# Patient Record
Sex: FEMALE | Race: WHITE | HISPANIC OR LATINO | Employment: UNEMPLOYED | URBAN - METROPOLITAN AREA
[De-identification: names, ages, dates, MRNs, and addresses within clinical notes are randomized per-mention and may not be internally consistent; named-entity substitution may affect disease eponyms.]

---
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
## 2019-03-26 ENCOUNTER — HOSPITAL ENCOUNTER (EMERGENCY)
Facility: MEDICAL CENTER | Age: 19
End: 2019-03-26
Attending: EMERGENCY MEDICINE

## 2019-03-26 VITALS
RESPIRATION RATE: 16 BRPM | TEMPERATURE: 97.7 F | SYSTOLIC BLOOD PRESSURE: 122 MMHG | WEIGHT: 140 LBS | HEART RATE: 63 BPM | DIASTOLIC BLOOD PRESSURE: 45 MMHG | BODY MASS INDEX: 21.97 KG/M2 | HEIGHT: 67 IN

## 2019-03-26 DIAGNOSIS — F41.8 SITUATIONAL ANXIETY: ICD-10-CM

## 2019-03-26 DIAGNOSIS — F43.21 SITUATIONAL DEPRESSION: ICD-10-CM

## 2019-03-26 LAB — POC BREATHALIZER: 0 PERCENT (ref 0–0.01)

## 2019-03-26 PROCEDURE — 302970 POC BREATHALIZER: Performed by: EMERGENCY MEDICINE

## 2019-03-26 PROCEDURE — 99284 EMERGENCY DEPT VISIT MOD MDM: CPT

## 2019-03-26 ASSESSMENT — ENCOUNTER SYMPTOMS
VOMITING: 0
DEPRESSION: 1
BACK PAIN: 0
NERVOUS/ANXIOUS: 1
SHORTNESS OF BREATH: 0
CHILLS: 0
FEVER: 0
HEADACHES: 0

## 2019-03-26 NOTE — ED NOTES
Legal hold de-certified by ERP.  Pt is not SI, HI, has family at bedside that feels safe bringing patient home

## 2019-03-26 NOTE — ED PROVIDER NOTES
"ED Provider Note    ED Provider Note    Primary care provider: Pcp Pt States None  Means of arrival: EMS  History obtained from: Patient  History limited by: EMS    CHIEF COMPLAINT  Chief Complaint   Patient presents with   • Legal 2000     reported SI two days ago to nurse at retirement, states she is not feeling suicidal now       HPI  Jaz Pinon is a 19 y.o. female who presents to the Emergency Department after being released from retirement.  Patient's been placed on a legal hold.  She was in retirement, at 5 PM yesterday.  During her enteric interview, she told them that she had a history of depression and anxiety and had cut into her stomach.  When she was released, she was sent here on a legal hold.  She denies any suicidal she is not medicated for her depression and anxiety.  She has no other past medical history.  She denies drug or alcohol use.  States she \"talks to her daughter\" to manage her symptoms.  She is otherwise been in her normal state of health.    REVIEW OF SYSTEMS  Review of Systems   Constitutional: Negative for chills and fever.   HENT: Negative for congestion.    Respiratory: Negative for shortness of breath.    Cardiovascular: Negative for chest pain.   Gastrointestinal: Negative for vomiting.   Genitourinary: Negative for dysuria.   Musculoskeletal: Negative for back pain.   Neurological: Negative for headaches.   Psychiatric/Behavioral: Positive for depression. The patient is nervous/anxious.    All other systems reviewed and are negative.      PAST MEDICAL HISTORY   has a past medical history of Anxiety; Depression; and PIH (pregnancy induced hypertension), third trimester (2/1/2018).    SURGICAL HISTORY   has a past surgical history that includes dental surgery.    SOCIAL HISTORY  Social History   Substance Use Topics   • Smoking status: Never Smoker   • Smokeless tobacco: Never Used   • Alcohol use No      History   Drug Use No       FAMILY HISTORY  Family History   Problem Relation Age " "of Onset   • No Known Problems Mother    • No Known Problems Father    • Hyperlipidemia Maternal Grandmother    • Asthma Brother        CURRENT MEDICATIONS  Home Medications    **Home medications have not yet been reviewed for this encounter**         ALLERGIES  No Known Allergies    PHYSICAL EXAM  VITAL SIGNS: /45   Pulse 63   Temp 36.5 °C (97.7 °F) (Oral)   Resp 16   Ht 1.702 m (5' 7\")   Wt 63.5 kg (140 lb)   BMI 21.93 kg/m²   Vitals reviewed.  Constitutional: Patient is oriented to person, place, and time. Appears well-developed and well-nourished. No distress.    Head: Normocephalic and atraumatic.   Mouth/Throat: Oropharynx is clear and moist  Eyes: Conjunctivae are normal.  Neck: Normal range of motion.   Cardiovascular: Normal rate, regular rhythm and normal heart sounds.  Pulmonary/Chest: Effort normal and breath sounds normal. No respiratory distress, no wheezes, rhonchi, or rales.  Abdominal: Soft. Bowel sounds are normal. There is no tenderness. No rebound or guarding, or peritoneal signs.  There are superficial linear marks across her abdominal wall.  Musculoskeletal: No edema  Neurological: No focal deficits.   Skin: Skin is warm and dry. No erythema. No pallor.   Psychiatric: Patient has a normal mood and affect.     LABS  Results for orders placed or performed during the hospital encounter of 03/26/19   POC BREATHALIZER   Result Value Ref Range    POC Breathalizer 0.00 0.00 - 0.01 Percent       All labs reviewed by me.    COURSE & MEDICAL DECISION MAKING  Pertinent Labs & Imaging studies reviewed. (See chart for details)    Obtained and reviewed past medical records.  Patient's last encounter is a postpartum visit from March 20 of 2018, last year.  This is after a normal spontaneous vaginal delivery.  Last a prior ED note in August 2017 with  Gastritis and first trimester pregnancy with associated urinary tract infection.    4:16 AM - Patient seen and examined at bedside.  Patient presents " here on a legal hold.  This was initiated in skilled nursing.  Patient denies having any active suicidal ideations or homicidal ideations.  She does not appear intoxicated.  Awaiting breathalyzer and drug screen,.  Patient will be seen by the alert team, prior to disposition.     5:15 AM discussed with the alert team who was able to evaluate the patient.  Sounds like there was an altercation, argument home and police arrived and to diffuse the situation, she was brought to skilled nursing.  She was quickly released.  Patient has a history of depression, she wants no medications.  She does not live in this community and does not plan to stay so she declined any resources for this area.  He agrees, the patient does not meet criteria for legal hold at this time.  Mother's here.  If mother feels comfortable, taking the patient home she can stay with her.  Then patient plans to go and live with her .    5:25 AM patient reevaluated the bedside.  Again denies SI or HI.  Family is at the bedside now.  They do feel comfortable taking her home.  Again, patient is not clinically intoxicated.  She states she simply came here to  her tax check when she plans to return to [place] to be with her  and her family.  At this time, she does not meet criteria for legal hold.  It was discontinued.  She will be discharged home in stable condition.        FINAL IMPRESSION  1. Situational depression    2. Situational anxiety

## 2019-03-26 NOTE — ED TRIAGE NOTES
BIBA from halfway on legal 2000.  Pt was put on a legal hold by RN at the halfway for self harm behavior.  Pt dose have self inflicted superficial lacerations to her abdomen.  She has a history of cutting and self harm behavior.  She currently denies SI, HI. She is calm and appropriate at this time.

## 2019-03-26 NOTE — DISCHARGE PLANNING
Patient sitting on bed.  Calm and cooperative.  Denies suicidal/homicidal ideation.  Was placed on hold at long term for self mutilation.  According to ERP very superficial scratches were made to abdomen.  Patient future oriented.  Strong family support  (Parents in waiting room at time of our conversation).  Patient admits to some anxiety and depression.  Adamantly denies need/want for mental health treatment.  Declined resources.  Bedside RN confirmed parents feel safe taking patient home.  Educated patient to return to the ER if thoughts of self harm occur.  Also educated patient to return to the ER if mood deteriorates.  Patient to be discharged to family and will be returning to live with her  in [facility].

## 2019-05-11 ENCOUNTER — APPOINTMENT (OUTPATIENT)
Dept: RADIOLOGY | Facility: MEDICAL CENTER | Age: 19
End: 2019-05-11
Attending: EMERGENCY MEDICINE

## 2019-05-11 ENCOUNTER — HOSPITAL ENCOUNTER (EMERGENCY)
Facility: MEDICAL CENTER | Age: 19
End: 2019-05-11
Attending: EMERGENCY MEDICINE

## 2019-05-11 VITALS
WEIGHT: 145.28 LBS | SYSTOLIC BLOOD PRESSURE: 114 MMHG | HEART RATE: 74 BPM | TEMPERATURE: 98.6 F | RESPIRATION RATE: 18 BRPM | DIASTOLIC BLOOD PRESSURE: 71 MMHG | BODY MASS INDEX: 24.8 KG/M2 | HEIGHT: 64 IN | OXYGEN SATURATION: 96 %

## 2019-05-11 DIAGNOSIS — R10.31 RIGHT LOWER QUADRANT ABDOMINAL PAIN: ICD-10-CM

## 2019-05-11 LAB
ALBUMIN SERPL BCP-MCNC: 5 G/DL (ref 3.2–4.9)
ALBUMIN/GLOB SERPL: 1.1 G/DL
ALP SERPL-CCNC: 91 U/L (ref 30–99)
ALT SERPL-CCNC: 24 U/L (ref 2–50)
ANION GAP SERPL CALC-SCNC: 10 MMOL/L (ref 0–11.9)
APPEARANCE UR: CLEAR
AST SERPL-CCNC: 20 U/L (ref 12–45)
BACTERIA #/AREA URNS HPF: ABNORMAL /HPF
BASOPHILS # BLD AUTO: 0.4 % (ref 0–1.8)
BASOPHILS # BLD: 0.04 K/UL (ref 0–0.12)
BILIRUB SERPL-MCNC: 0.7 MG/DL (ref 0.1–1.5)
BILIRUB UR QL STRIP.AUTO: NEGATIVE
BUN SERPL-MCNC: 12 MG/DL (ref 8–22)
CALCIUM SERPL-MCNC: 10.3 MG/DL (ref 8.5–10.5)
CHLORIDE SERPL-SCNC: 102 MMOL/L (ref 96–112)
CO2 SERPL-SCNC: 24 MMOL/L (ref 20–33)
COLOR UR: YELLOW
CREAT SERPL-MCNC: 0.74 MG/DL (ref 0.5–1.4)
EOSINOPHIL # BLD AUTO: 0.09 K/UL (ref 0–0.51)
EOSINOPHIL NFR BLD: 0.9 % (ref 0–6.9)
EPI CELLS #/AREA URNS HPF: ABNORMAL /HPF
ERYTHROCYTE [DISTWIDTH] IN BLOOD BY AUTOMATED COUNT: 51.6 FL (ref 35.9–50)
GLOBULIN SER CALC-MCNC: 4.4 G/DL (ref 1.9–3.5)
GLUCOSE SERPL-MCNC: 98 MG/DL (ref 65–99)
GLUCOSE UR STRIP.AUTO-MCNC: NEGATIVE MG/DL
HCG SERPL QL: NEGATIVE
HCT VFR BLD AUTO: 47.7 % (ref 37–47)
HGB BLD-MCNC: 15.1 G/DL (ref 12–16)
HYALINE CASTS #/AREA URNS LPF: ABNORMAL /LPF
IMM GRANULOCYTES # BLD AUTO: 0.04 K/UL (ref 0–0.11)
IMM GRANULOCYTES NFR BLD AUTO: 0.4 % (ref 0–0.9)
KETONES UR STRIP.AUTO-MCNC: NEGATIVE MG/DL
LEUKOCYTE ESTERASE UR QL STRIP.AUTO: ABNORMAL
LIPASE SERPL-CCNC: 18 U/L (ref 11–82)
LYMPHOCYTES # BLD AUTO: 2.11 K/UL (ref 1–4.8)
LYMPHOCYTES NFR BLD: 21 % (ref 22–41)
MCH RBC QN AUTO: 28.2 PG (ref 27–33)
MCHC RBC AUTO-ENTMCNC: 31.7 G/DL (ref 33.6–35)
MCV RBC AUTO: 89.2 FL (ref 81.4–97.8)
MICRO URNS: ABNORMAL
MONOCYTES # BLD AUTO: 0.49 K/UL (ref 0–0.85)
MONOCYTES NFR BLD AUTO: 4.9 % (ref 0–13.4)
NEUTROPHILS # BLD AUTO: 7.3 K/UL (ref 2–7.15)
NEUTROPHILS NFR BLD: 72.4 % (ref 44–72)
NITRITE UR QL STRIP.AUTO: NEGATIVE
NRBC # BLD AUTO: 0 K/UL
NRBC BLD-RTO: 0 /100 WBC
PH UR STRIP.AUTO: 6 [PH]
PLATELET # BLD AUTO: 394 K/UL (ref 164–446)
PMV BLD AUTO: 9.3 FL (ref 9–12.9)
POTASSIUM SERPL-SCNC: 4.1 MMOL/L (ref 3.6–5.5)
PROT SERPL-MCNC: 9.4 G/DL (ref 6–8.2)
PROT UR QL STRIP: NEGATIVE MG/DL
RBC # BLD AUTO: 5.35 M/UL (ref 4.2–5.4)
RBC # URNS HPF: ABNORMAL /HPF
RBC UR QL AUTO: NEGATIVE
SODIUM SERPL-SCNC: 136 MMOL/L (ref 135–145)
SP GR UR STRIP.AUTO: 1.03
UROBILINOGEN UR STRIP.AUTO-MCNC: 1 MG/DL
WBC # BLD AUTO: 10.1 K/UL (ref 4.8–10.8)
WBC #/AREA URNS HPF: ABNORMAL /HPF

## 2019-05-11 PROCEDURE — 84703 CHORIONIC GONADOTROPIN ASSAY: CPT

## 2019-05-11 PROCEDURE — 85025 COMPLETE CBC W/AUTO DIFF WBC: CPT

## 2019-05-11 PROCEDURE — 99285 EMERGENCY DEPT VISIT HI MDM: CPT

## 2019-05-11 PROCEDURE — 700111 HCHG RX REV CODE 636 W/ 250 OVERRIDE (IP): Performed by: EMERGENCY MEDICINE

## 2019-05-11 PROCEDURE — 96374 THER/PROPH/DIAG INJ IV PUSH: CPT

## 2019-05-11 PROCEDURE — 700117 HCHG RX CONTRAST REV CODE 255: Performed by: EMERGENCY MEDICINE

## 2019-05-11 PROCEDURE — 81001 URINALYSIS AUTO W/SCOPE: CPT

## 2019-05-11 PROCEDURE — 83690 ASSAY OF LIPASE: CPT

## 2019-05-11 PROCEDURE — 80053 COMPREHEN METABOLIC PANEL: CPT

## 2019-05-11 PROCEDURE — 74177 CT ABD & PELVIS W/CONTRAST: CPT

## 2019-05-11 PROCEDURE — 96375 TX/PRO/DX INJ NEW DRUG ADDON: CPT

## 2019-05-11 RX ORDER — DIPHENHYDRAMINE HYDROCHLORIDE 50 MG/ML
25 INJECTION INTRAMUSCULAR; INTRAVENOUS ONCE
Status: COMPLETED | OUTPATIENT
Start: 2019-05-11 | End: 2019-05-11

## 2019-05-11 RX ORDER — KETOROLAC TROMETHAMINE 30 MG/ML
INJECTION, SOLUTION INTRAMUSCULAR; INTRAVENOUS
Status: DISCONTINUED
Start: 2019-05-11 | End: 2019-05-11 | Stop reason: HOSPADM

## 2019-05-11 RX ORDER — SIMETHICONE 80 MG
80 TABLET,CHEWABLE ORAL EVERY 6 HOURS PRN
Qty: 20 TAB | Refills: 0 | Status: ON HOLD | OUTPATIENT
Start: 2019-05-11 | End: 2023-11-26

## 2019-05-11 RX ORDER — KETOROLAC TROMETHAMINE 30 MG/ML
30 INJECTION, SOLUTION INTRAMUSCULAR; INTRAVENOUS ONCE
Status: COMPLETED | OUTPATIENT
Start: 2019-05-11 | End: 2019-05-11

## 2019-05-11 RX ADMIN — DIPHENHYDRAMINE HYDROCHLORIDE 25 MG: 50 INJECTION INTRAMUSCULAR; INTRAVENOUS at 18:15

## 2019-05-11 RX ADMIN — IOHEXOL 100 ML: 350 INJECTION, SOLUTION INTRAVENOUS at 17:00

## 2019-05-11 RX ADMIN — KETOROLAC TROMETHAMINE 30 MG: 30 INJECTION, SOLUTION INTRAMUSCULAR at 14:13

## 2019-05-11 NOTE — ED NOTES
"Pt ambulates to triage with c/c right pelvic pain that radiates into right hip x2 days.  \"I can't tell if the pain is in my pelvic area or my hip.\"   Unknown pregnancy status.  A&ox4.  Pt to lobby & advised to inform RN of any changes.   "

## 2019-05-11 NOTE — ED PROVIDER NOTES
ED Provider Note    CHIEF COMPLAINT  Chief Complaint   Patient presents with   • Pelvic Pain     right side, x2 days       HPI  Jaz Pinon is a 19 y.o. female who presents with right lower quadrant abdominal pain, currently worse with movement.  She states pain has been intermittent over the past 3 days.  It started his bilateral lower abdominal cramping on day 1, moved to the right lower quadrant on day 2.  She missed her last menstrual cycle.  She is concerned she might be pregnant.  No vaginal discharge or bleeding.  No dysuria.  She states normal bowel movements.  No nausea or.  Vomiting.  Patient denies similar complaint in the past.  She denies previous abdominal surgery.    REVIEW OF SYSTEMS  Constitutional: No fever  Respiratory: No shortness of breath  Cardiac: No chest pain or syncope  Gastrointestinal: Right lower quadrant abdominal pain  Musculoskeletal: No flank pain  Neurologic: No headache  Genitourinary: No dysuria, no vaginal bleeding, recent missed menstrual cycle, LMP in March       All other systems are negative.     PAST MEDICAL HISTORY  Past Medical History:   Diagnosis Date   • Anxiety    • Depression     stopped taking medication 2 years ago. she has been admitted to [facility] 2 times   • PIH (pregnancy induced hypertension), third trimester 2/1/2018       FAMILY HISTORY  Family History   Problem Relation Age of Onset   • No Known Problems Mother    • No Known Problems Father    • Hyperlipidemia Maternal Grandmother    • Asthma Brother        SOCIAL HISTORY  Social History     Social History   • Marital status: Single     Spouse name: N/A   • Number of children: N/A   • Years of education: N/A     Social History Main Topics   • Smoking status: Never Smoker   • Smokeless tobacco: Never Used   • Alcohol use No   • Drug use: No   • Sexual activity: Yes     Partners: Male     Birth control/ protection: Pill      Comment: Unplanned pregnancy     Other Topics Concern   • Not on file  "    Social History Narrative   • No narrative on file       SURGICAL HISTORY  Past Surgical History:   Procedure Laterality Date   • DENTAL SURGERY      wisdom teeth       CURRENT MEDICATIONS  Home Medications     Reviewed by Ekta Lagunas R.N. (Registered Nurse) on 05/11/19 at 1211  Med List Status: Not Addressed   Medication Last Dose Status   docusate sodium 100 MG Cap  Active   ferrous sulfate 325 (65 Fe) MG tablet  Active   ibuprofen (MOTRIN) 600 MG Tab  Active   Prenatal MV-Min-Fe Fum-FA-DHA (PRENATAL 1 PO)  Active                ALLERGIES  No Known Allergies    PHYSICAL EXAM  VITAL SIGNS: /84   Pulse (!) 119   Temp 37 °C (98.6 °F) (Temporal)   Resp 12   Ht 1.626 m (5' 4\")   Wt 65.9 kg (145 lb 4.5 oz)   SpO2 96%   BMI 24.94 kg/m²   Constitutional: Well-nourished, no distress  ENT: Nares clear, mucous membranes moist.  Eyes:  Conjunctiva normal, No discharge.    Lymphatic: No adenopathy.   Cardiovascular: Tachycardic heart rate, Normal rhythm.   Pulmonary: No wheezing, no rales  Gastrointestinal: Tenderness over McBurney's point.  Negative Rovsing sign.  No distention, no palpable mass  Skin: Warm, Dry, No jaundice.   Musculoskeletal:  No CVA tenderness.   Neurologic:  Normal motor and sensory function, No focal deficits noted.   Psychiatric:Normal affect, Normal mood.    RADIOLOGY/PROCEDURES/Labs  Results for orders placed or performed during the hospital encounter of 05/11/19   CBC WITH DIFFERENTIAL   Result Value Ref Range    WBC 10.1 4.8 - 10.8 K/uL    RBC 5.35 4.20 - 5.40 M/uL    Hemoglobin 15.1 12.0 - 16.0 g/dL    Hematocrit 47.7 (H) 37.0 - 47.0 %    MCV 89.2 81.4 - 97.8 fL    MCH 28.2 27.0 - 33.0 pg    MCHC 31.7 (L) 33.6 - 35.0 g/dL    RDW 51.6 (H) 35.9 - 50.0 fL    Platelet Count 394 164 - 446 K/uL    MPV 9.3 9.0 - 12.9 fL    Neutrophils-Polys 72.40 (H) 44.00 - 72.00 %    Lymphocytes 21.00 (L) 22.00 - 41.00 %    Monocytes 4.90 0.00 - 13.40 %    Eosinophils 0.90 0.00 - 6.90 %    " Basophils 0.40 0.00 - 1.80 %    Immature Granulocytes 0.40 0.00 - 0.90 %    Nucleated RBC 0.00 /100 WBC    Neutrophils (Absolute) 7.30 (H) 2.00 - 7.15 K/uL    Lymphs (Absolute) 2.11 1.00 - 4.80 K/uL    Monos (Absolute) 0.49 0.00 - 0.85 K/uL    Eos (Absolute) 0.09 0.00 - 0.51 K/uL    Baso (Absolute) 0.04 0.00 - 0.12 K/uL    Immature Granulocytes (abs) 0.04 0.00 - 0.11 K/uL    NRBC (Absolute) 0.00 K/uL   COMP METABOLIC PANEL   Result Value Ref Range    Sodium 136 135 - 145 mmol/L    Potassium 4.1 3.6 - 5.5 mmol/L    Chloride 102 96 - 112 mmol/L    Co2 24 20 - 33 mmol/L    Anion Gap 10.0 0.0 - 11.9    Glucose 98 65 - 99 mg/dL    Bun 12 8 - 22 mg/dL    Creatinine 0.74 0.50 - 1.40 mg/dL    Calcium 10.3 8.5 - 10.5 mg/dL    AST(SGOT) 20 12 - 45 U/L    ALT(SGPT) 24 2 - 50 U/L    Alkaline Phosphatase 91 30 - 99 U/L    Total Bilirubin 0.7 0.1 - 1.5 mg/dL    Albumin 5.0 (H) 3.2 - 4.9 g/dL    Total Protein 9.4 (H) 6.0 - 8.2 g/dL    Globulin 4.4 (H) 1.9 - 3.5 g/dL    A-G Ratio 1.1 g/dL   LIPASE   Result Value Ref Range    Lipase 18 11 - 82 U/L   HCG QUAL SERUM   Result Value Ref Range    Beta-Hcg Qualitative Serum Negative Negative   URINALYSIS CULTURE, IF INDICATED   Result Value Ref Range    Color Yellow     Character Clear     Specific Gravity 1.029 <1.035    Ph 6.0 5.0 - 8.0    Glucose Negative Negative mg/dL    Ketones Negative Negative mg/dL    Protein Negative Negative mg/dL    Bilirubin Negative Negative    Urobilinogen, Urine 1.0 Negative    Nitrite Negative Negative    Leukocyte Esterase Trace (A) Negative    Occult Blood Negative Negative    Micro Urine Req Microscopic    URINE MICROSCOPIC (W/UA)   Result Value Ref Range    WBC 5-10 (A) /hpf    RBC 0-2 /hpf    Bacteria Few (A) None /hpf    Epithelial Cells Few /hpf    Hyaline Cast 3-5 (A) /lpf   ESTIMATED GFR   Result Value Ref Range    GFR If African American >60 >60 mL/min/1.73 m 2    GFR If Non African American >60 >60 mL/min/1.73 m 2     CT-ABDOMEN-PELVIS WITH     (Results Pending)         COURSE & MEDICAL DECISION MAKING  Pertinent Labs & Imaging studies reviewed. (See chart for details)  Patient with right lower quadrant abdominal pain, unexplained on laboratory evaluation.  She has normal white blood cell count, normal appetite however does describe generalized pain which is now localized in the right lower quadrant.  Differential diagnosis including kidney stone, gastroenteritis, mesenteric adenitis, appendicitis, ovarian cyst, tubal pregnancy.    Pregnancy test is negative.  Urinalysis shows equivocal results, 5-10 white cells, trace leukocyte esterase.  She lacks dysuria, urine will be sent for pregnancy.    CT scan is pending, will be signed out to next ER physician.    FINAL IMPRESSION  1. Right lower quadrant abdominal pain            Electronically signed by: Miguel Isaac, 5/11/2019 1:39 PM

## 2019-05-12 ENCOUNTER — HOSPITAL ENCOUNTER (EMERGENCY)
Facility: MEDICAL CENTER | Age: 19
End: 2019-05-12
Attending: EMERGENCY MEDICINE

## 2019-05-12 VITALS
BODY MASS INDEX: 25.35 KG/M2 | SYSTOLIC BLOOD PRESSURE: 117 MMHG | DIASTOLIC BLOOD PRESSURE: 53 MMHG | WEIGHT: 147.71 LBS | OXYGEN SATURATION: 96 % | TEMPERATURE: 98 F | RESPIRATION RATE: 18 BRPM | HEART RATE: 94 BPM

## 2019-05-12 DIAGNOSIS — F41.9 ANXIETY: ICD-10-CM

## 2019-05-12 LAB
ALBUMIN SERPL BCP-MCNC: 4.9 G/DL (ref 3.2–4.9)
ALBUMIN/GLOB SERPL: 1.2 G/DL
ALP SERPL-CCNC: 79 U/L (ref 30–99)
ALT SERPL-CCNC: 31 U/L (ref 2–50)
ANION GAP SERPL CALC-SCNC: 9 MMOL/L (ref 0–11.9)
AST SERPL-CCNC: 26 U/L (ref 12–45)
BASOPHILS # BLD AUTO: 0.2 % (ref 0–1.8)
BASOPHILS # BLD: 0.03 K/UL (ref 0–0.12)
BILIRUB SERPL-MCNC: 0.7 MG/DL (ref 0.1–1.5)
BUN SERPL-MCNC: 13 MG/DL (ref 8–22)
CALCIUM SERPL-MCNC: 9.7 MG/DL (ref 8.5–10.5)
CHLORIDE SERPL-SCNC: 106 MMOL/L (ref 96–112)
CO2 SERPL-SCNC: 23 MMOL/L (ref 20–33)
CREAT SERPL-MCNC: 0.72 MG/DL (ref 0.5–1.4)
EOSINOPHIL # BLD AUTO: 0.07 K/UL (ref 0–0.51)
EOSINOPHIL NFR BLD: 0.6 % (ref 0–6.9)
ERYTHROCYTE [DISTWIDTH] IN BLOOD BY AUTOMATED COUNT: 50.4 FL (ref 35.9–50)
GLOBULIN SER CALC-MCNC: 4.1 G/DL (ref 1.9–3.5)
GLUCOSE SERPL-MCNC: 94 MG/DL (ref 65–99)
HCT VFR BLD AUTO: 43.5 % (ref 37–47)
HGB BLD-MCNC: 14.2 G/DL (ref 12–16)
IMM GRANULOCYTES # BLD AUTO: 0.04 K/UL (ref 0–0.11)
IMM GRANULOCYTES NFR BLD AUTO: 0.3 % (ref 0–0.9)
LYMPHOCYTES # BLD AUTO: 2.23 K/UL (ref 1–4.8)
LYMPHOCYTES NFR BLD: 18.1 % (ref 22–41)
MCH RBC QN AUTO: 28.8 PG (ref 27–33)
MCHC RBC AUTO-ENTMCNC: 32.6 G/DL (ref 33.6–35)
MCV RBC AUTO: 88.2 FL (ref 81.4–97.8)
MONOCYTES # BLD AUTO: 0.68 K/UL (ref 0–0.85)
MONOCYTES NFR BLD AUTO: 5.5 % (ref 0–13.4)
NEUTROPHILS # BLD AUTO: 9.3 K/UL (ref 2–7.15)
NEUTROPHILS NFR BLD: 75.3 % (ref 44–72)
NRBC # BLD AUTO: 0 K/UL
NRBC BLD-RTO: 0 /100 WBC
PLATELET # BLD AUTO: 381 K/UL (ref 164–446)
PMV BLD AUTO: 9.3 FL (ref 9–12.9)
POTASSIUM SERPL-SCNC: 3.4 MMOL/L (ref 3.6–5.5)
PROT SERPL-MCNC: 9 G/DL (ref 6–8.2)
RBC # BLD AUTO: 4.93 M/UL (ref 4.2–5.4)
SODIUM SERPL-SCNC: 138 MMOL/L (ref 135–145)
WBC # BLD AUTO: 12.4 K/UL (ref 4.8–10.8)

## 2019-05-12 PROCEDURE — 96372 THER/PROPH/DIAG INJ SC/IM: CPT

## 2019-05-12 PROCEDURE — 700105 HCHG RX REV CODE 258: Performed by: EMERGENCY MEDICINE

## 2019-05-12 PROCEDURE — 700111 HCHG RX REV CODE 636 W/ 250 OVERRIDE (IP): Performed by: EMERGENCY MEDICINE

## 2019-05-12 PROCEDURE — 80053 COMPREHEN METABOLIC PANEL: CPT

## 2019-05-12 PROCEDURE — 36415 COLL VENOUS BLD VENIPUNCTURE: CPT

## 2019-05-12 PROCEDURE — 99285 EMERGENCY DEPT VISIT HI MDM: CPT

## 2019-05-12 PROCEDURE — 700102 HCHG RX REV CODE 250 W/ 637 OVERRIDE(OP): Performed by: EMERGENCY MEDICINE

## 2019-05-12 PROCEDURE — 85025 COMPLETE CBC W/AUTO DIFF WBC: CPT

## 2019-05-12 PROCEDURE — A9270 NON-COVERED ITEM OR SERVICE: HCPCS | Performed by: EMERGENCY MEDICINE

## 2019-05-12 PROCEDURE — 96374 THER/PROPH/DIAG INJ IV PUSH: CPT

## 2019-05-12 RX ORDER — DIPHENHYDRAMINE HCL 25 MG
25 TABLET ORAL ONCE
Status: COMPLETED | OUTPATIENT
Start: 2019-05-12 | End: 2019-05-12

## 2019-05-12 RX ORDER — SODIUM CHLORIDE 9 MG/ML
1000 INJECTION, SOLUTION INTRAVENOUS ONCE
Status: COMPLETED | OUTPATIENT
Start: 2019-05-12 | End: 2019-05-12

## 2019-05-12 RX ORDER — LORAZEPAM 2 MG/ML
1 INJECTION INTRAMUSCULAR ONCE
Status: COMPLETED | OUTPATIENT
Start: 2019-05-12 | End: 2019-05-12

## 2019-05-12 RX ORDER — PROMETHAZINE HYDROCHLORIDE 25 MG/1
25 TABLET ORAL ONCE
Status: COMPLETED | OUTPATIENT
Start: 2019-05-12 | End: 2019-05-12

## 2019-05-12 RX ORDER — HALOPERIDOL 5 MG/ML
5 INJECTION INTRAMUSCULAR ONCE
Status: COMPLETED | OUTPATIENT
Start: 2019-05-12 | End: 2019-05-12

## 2019-05-12 RX ADMIN — DIPHENHYDRAMINE HCL 25 MG: 25 TABLET ORAL at 19:51

## 2019-05-12 RX ADMIN — SODIUM CHLORIDE 1000 ML: 9 INJECTION, SOLUTION INTRAVENOUS at 22:25

## 2019-05-12 RX ADMIN — LORAZEPAM 1 MG: 2 INJECTION INTRAMUSCULAR; INTRAVENOUS at 20:17

## 2019-05-12 RX ADMIN — HALOPERIDOL LACTATE 5 MG: 5 INJECTION, SOLUTION INTRAMUSCULAR at 22:25

## 2019-05-12 RX ADMIN — PROMETHAZINE HYDROCHLORIDE 25 MG: 25 TABLET ORAL at 19:51

## 2019-05-12 NOTE — DISCHARGE INSTRUCTIONS
You were seen in the Emergency Department for abdominal pain.    Labs, urine test, CT scan were completed without significant acute abnormalities.    Please use 1,000mg of tylenol or 600mg of ibuprofen every 6 hours as needed for pain.  Take simethicone as needed for bloating and gas.  Drink plenty of fluids    Please follow up with your primary care physician.    Return to the Emergency Department with worsening abdominal pain, persistent vomiting, fevers greater than 100.4, or other concerns.

## 2019-05-12 NOTE — ED PROVIDER NOTES
ED PROVIDER NOTE    Scribed for Frances Omer M.D. by Doreen Baum. 5/11/2019, 5:48 PM.    This is an addendum to the note on Jaz Pinon. For further details and full chart entry, see the previously signed ED Provider Note written by Dr. Isaac (ERP).      5:12 PM I discussed the patient's case with Dr. Isaac (ERP) who will transfer care of the patient to me at this time.  She is a young otherwise healthy female who presents with few day history of right lower quadrant pain.  Labs, urine test were reassuring.  Patient is not pregnant.  Disposition is pending CT scan.    5:48 PM Patient evaluated at bedside. Discussed radiology results with patient which did not show any acute abnormalities. Patient was instructed to use Tylenol or ibuprofen at home as need for pain and was given prescription for simethicone to relieve bloatedness. Patient understands and is comfortable to discharge home with instructions on supportive care. The patient will return for new or worsening symptoms and is stable at the time of discharge.    RADIOLOGY  CT-ABDOMEN-PELVIS WITH   Final Result      Unremarkable contrast-enhanced CT of the abdomen and pelvis.      Reviewed by me.     DISPOSITION:  Patient will be discharged home in stable condition.    FOLLOW UP:  [address] [address] [phone]  Call   to establish PCP    [facility], Emergency Dept  [address] [facility] [address] [phone]    If symptoms worsen    OUTPATIENT MEDICATIONS:  New Prescriptions    SIMETHICONE (MYLICON) 80 MG CHEW TAB    Take 1 Tab by mouth every 6 hours as needed (gas/bloating).     FINAL IMPRESSION   1. Right lower quadrant abdominal pain          Doreen MALDONADO ([place]), am scribing for, and in the presence of, Frances Omer M.D..  Electronically signed by: Doreen Christina), 5/11/2019  Frances MALDONADO M.D. personally performed the services described in  this documentation, as scribed by Doreen Baum in my presence, and it is both accurate and complete.     The note accurately reflects work and decisions made by me.  Frances Omer  5/12/2019  1:40 AM

## 2019-05-13 ENCOUNTER — APPOINTMENT (OUTPATIENT)
Dept: RADIOLOGY | Facility: MEDICAL CENTER | Age: 19
End: 2019-05-13
Attending: EMERGENCY MEDICINE

## 2019-05-13 ENCOUNTER — HOSPITAL ENCOUNTER (EMERGENCY)
Facility: MEDICAL CENTER | Age: 19
End: 2019-05-13
Attending: EMERGENCY MEDICINE

## 2019-05-13 VITALS
RESPIRATION RATE: 18 BRPM | HEIGHT: 64 IN | BODY MASS INDEX: 23.9 KG/M2 | OXYGEN SATURATION: 96 % | DIASTOLIC BLOOD PRESSURE: 81 MMHG | TEMPERATURE: 98 F | SYSTOLIC BLOOD PRESSURE: 143 MMHG | WEIGHT: 140 LBS | HEART RATE: 93 BPM

## 2019-05-13 DIAGNOSIS — R10.84 GENERALIZED ABDOMINAL PAIN: ICD-10-CM

## 2019-05-13 LAB
ALBUMIN SERPL BCP-MCNC: 4.7 G/DL (ref 3.2–4.9)
ALBUMIN/GLOB SERPL: 1.2 G/DL
ALP SERPL-CCNC: 74 U/L (ref 30–99)
ALT SERPL-CCNC: 42 U/L (ref 2–50)
ANION GAP SERPL CALC-SCNC: 12 MMOL/L (ref 0–11.9)
APPEARANCE UR: CLEAR
AST SERPL-CCNC: 31 U/L (ref 12–45)
BACTERIA #/AREA URNS HPF: ABNORMAL /HPF
BASOPHILS # BLD AUTO: 0.3 % (ref 0–1.8)
BASOPHILS # BLD: 0.04 K/UL (ref 0–0.12)
BILIRUB SERPL-MCNC: 0.7 MG/DL (ref 0.1–1.5)
BILIRUB UR QL STRIP.AUTO: NEGATIVE
BUN SERPL-MCNC: 13 MG/DL (ref 8–22)
CALCIUM SERPL-MCNC: 9.3 MG/DL (ref 8.5–10.5)
CHLORIDE SERPL-SCNC: 105 MMOL/L (ref 96–112)
CO2 SERPL-SCNC: 21 MMOL/L (ref 20–33)
COLOR UR: YELLOW
CREAT SERPL-MCNC: 0.71 MG/DL (ref 0.5–1.4)
EOSINOPHIL # BLD AUTO: 0.04 K/UL (ref 0–0.51)
EOSINOPHIL NFR BLD: 0.3 % (ref 0–6.9)
EPI CELLS #/AREA URNS HPF: ABNORMAL /HPF
ERYTHROCYTE [DISTWIDTH] IN BLOOD BY AUTOMATED COUNT: 51.7 FL (ref 35.9–50)
GLOBULIN SER CALC-MCNC: 3.8 G/DL (ref 1.9–3.5)
GLUCOSE SERPL-MCNC: 92 MG/DL (ref 65–99)
GLUCOSE UR STRIP.AUTO-MCNC: NEGATIVE MG/DL
HCG UR QL: NEGATIVE
HCT VFR BLD AUTO: 41.6 % (ref 37–47)
HGB BLD-MCNC: 13.7 G/DL (ref 12–16)
HYALINE CASTS #/AREA URNS LPF: ABNORMAL /LPF
IMM GRANULOCYTES # BLD AUTO: 0.04 K/UL (ref 0–0.11)
IMM GRANULOCYTES NFR BLD AUTO: 0.3 % (ref 0–0.9)
KETONES UR STRIP.AUTO-MCNC: 80 MG/DL
LEUKOCYTE ESTERASE UR QL STRIP.AUTO: ABNORMAL
LIPASE SERPL-CCNC: 23 U/L (ref 11–82)
LYMPHOCYTES # BLD AUTO: 1.74 K/UL (ref 1–4.8)
LYMPHOCYTES NFR BLD: 14.5 % (ref 22–41)
MCH RBC QN AUTO: 29.7 PG (ref 27–33)
MCHC RBC AUTO-ENTMCNC: 32.9 G/DL (ref 33.6–35)
MCV RBC AUTO: 90 FL (ref 81.4–97.8)
MICRO URNS: ABNORMAL
MONOCYTES # BLD AUTO: 0.52 K/UL (ref 0–0.85)
MONOCYTES NFR BLD AUTO: 4.3 % (ref 0–13.4)
NEUTROPHILS # BLD AUTO: 9.61 K/UL (ref 2–7.15)
NEUTROPHILS NFR BLD: 80.3 % (ref 44–72)
NITRITE UR QL STRIP.AUTO: NEGATIVE
NRBC # BLD AUTO: 0 K/UL
NRBC BLD-RTO: 0 /100 WBC
PH UR STRIP.AUTO: 6.5 [PH]
PLATELET # BLD AUTO: 349 K/UL (ref 164–446)
PMV BLD AUTO: 9.3 FL (ref 9–12.9)
POTASSIUM SERPL-SCNC: 3.5 MMOL/L (ref 3.6–5.5)
PROT SERPL-MCNC: 8.5 G/DL (ref 6–8.2)
PROT UR QL STRIP: NEGATIVE MG/DL
RBC # BLD AUTO: 4.62 M/UL (ref 4.2–5.4)
RBC # URNS HPF: ABNORMAL /HPF
RBC UR QL AUTO: NEGATIVE
SODIUM SERPL-SCNC: 138 MMOL/L (ref 135–145)
SP GR UR STRIP.AUTO: 1.02
UROBILINOGEN UR STRIP.AUTO-MCNC: 1 MG/DL
WBC # BLD AUTO: 12 K/UL (ref 4.8–10.8)
WBC #/AREA URNS HPF: ABNORMAL /HPF

## 2019-05-13 PROCEDURE — 700105 HCHG RX REV CODE 258: Performed by: EMERGENCY MEDICINE

## 2019-05-13 PROCEDURE — 85025 COMPLETE CBC W/AUTO DIFF WBC: CPT

## 2019-05-13 PROCEDURE — 81025 URINE PREGNANCY TEST: CPT | Performed by: EMERGENCY MEDICINE

## 2019-05-13 PROCEDURE — 700111 HCHG RX REV CODE 636 W/ 250 OVERRIDE (IP): Performed by: EMERGENCY MEDICINE

## 2019-05-13 PROCEDURE — 96375 TX/PRO/DX INJ NEW DRUG ADDON: CPT

## 2019-05-13 PROCEDURE — 83690 ASSAY OF LIPASE: CPT

## 2019-05-13 PROCEDURE — 81002 URINALYSIS NONAUTO W/O SCOPE: CPT | Performed by: EMERGENCY MEDICINE

## 2019-05-13 PROCEDURE — 81002 URINALYSIS NONAUTO W/O SCOPE: CPT

## 2019-05-13 PROCEDURE — 74176 CT ABD & PELVIS W/O CONTRAST: CPT

## 2019-05-13 PROCEDURE — 96374 THER/PROPH/DIAG INJ IV PUSH: CPT

## 2019-05-13 PROCEDURE — 81001 URINALYSIS AUTO W/SCOPE: CPT

## 2019-05-13 PROCEDURE — 80053 COMPREHEN METABOLIC PANEL: CPT

## 2019-05-13 PROCEDURE — 81025 URINE PREGNANCY TEST: CPT

## 2019-05-13 PROCEDURE — 99285 EMERGENCY DEPT VISIT HI MDM: CPT

## 2019-05-13 RX ORDER — SODIUM CHLORIDE 9 MG/ML
1000 INJECTION, SOLUTION INTRAVENOUS ONCE
Status: COMPLETED | OUTPATIENT
Start: 2019-05-13 | End: 2019-05-13

## 2019-05-13 RX ORDER — DIPHENHYDRAMINE HYDROCHLORIDE 50 MG/ML
50 INJECTION INTRAMUSCULAR; INTRAVENOUS ONCE
Status: COMPLETED | OUTPATIENT
Start: 2019-05-13 | End: 2019-05-13

## 2019-05-13 RX ORDER — HALOPERIDOL 5 MG/ML
5 INJECTION INTRAMUSCULAR ONCE
Status: COMPLETED | OUTPATIENT
Start: 2019-05-13 | End: 2019-05-13

## 2019-05-13 RX ADMIN — HALOPERIDOL LACTATE 5 MG: 5 INJECTION, SOLUTION INTRAMUSCULAR at 21:02

## 2019-05-13 RX ADMIN — SODIUM CHLORIDE 1000 ML: 9 INJECTION, SOLUTION INTRAVENOUS at 21:02

## 2019-05-13 RX ADMIN — DIPHENHYDRAMINE HYDROCHLORIDE 50 MG: 50 INJECTION INTRAMUSCULAR; INTRAVENOUS at 21:02

## 2019-05-13 NOTE — ED NOTES
Hourly rounding performed. Assessed patient complaints and Bathroom/comfort needs.  Patient reports that the shaking and vision has improved after the ativan, but is still having some of those S/sx.

## 2019-05-13 NOTE — ED NOTES
Pt reports that she was seen in the ED yesterday, had a ABD/Pelvic CT w/ contrast, Since then has been having off/on body shakes. She states that she sometimes gets this with her panic attacks, but typically doesn't last this long.

## 2019-05-13 NOTE — ED NOTES
Hourly rounding performed. Assessed patient complaints and Bathroom/comfort needs.  Pt states feeling improved

## 2019-05-13 NOTE — ED PROVIDER NOTES
ED Provider Note    CHIEF COMPLAINT  Chief Complaint   Patient presents with   • Anxiety   • Medication Reaction       HPI  Jaz Pinon is a 19 y.o. female who presents with tremors and nausea.  The patient states she was here last night for pelvic pain.  She states she had CT imaging and after the CT scan she developed tremors.  She states she also has associated nausea.  She states her pelvic pain has improved.  She has not any vomiting but she does have nausea.  She states she does have a history of anxiety but this seems to be different.  She does not have any difficulty with breathing.  She is concerned this is a reaction to the contrast she received for the CT scan.  She does not have any difficulty with swallowing.  She does not have any rashes.    REVIEW OF SYSTEMS  See HPI for further details. All other systems are negative.     PAST MEDICAL HISTORY  Past Medical History:   Diagnosis Date   • PIH (pregnancy induced hypertension), third trimester 2/1/2018   • Anxiety    • Depression     stopped taking medication 2 years ago. she has been admitted to [facility] 2 times       FAMILY HISTORY  [unfilled]    SOCIAL HISTORY  Social History     Social History   • Marital status: Single     Spouse name: N/A   • Number of children: N/A   • Years of education: N/A     Social History Main Topics   • Smoking status: Never Smoker   • Smokeless tobacco: Never Used   • Alcohol use No   • Drug use: No   • Sexual activity: Yes     Partners: Male     Birth control/ protection: Pill      Comment: Unplanned pregnancy     Other Topics Concern   • Not on file     Social History Narrative   • No narrative on file       SURGICAL HISTORY  Past Surgical History:   Procedure Laterality Date   • DENTAL SURGERY      wisdom teeth       CURRENT MEDICATIONS  Home Medications     Reviewed by Fifi Liu R.N. (Registered Nurse) on 05/12/19 at 1905  Med List Status: Complete   Medication Last Dose Status   docusate sodium 100  MG Cap not taking Active   ferrous sulfate 325 (65 Fe) MG tablet not taking Active   ibuprofen (MOTRIN) 600 MG Tab not taking Active   Prenatal MV-Min-Fe Fum-FA-DHA (PRENATAL 1 PO) not taking Active   simethicone (MYLICON) 80 MG Chew Tab not taking Active                ALLERGIES  No Known Allergies    PHYSICAL EXAM  VITAL SIGNS: /72   Pulse (!) 120   Temp 36.7 °C (98 °F) (Temporal)   Resp (!) 22   Wt 67 kg (147 lb 11.3 oz)   SpO2 98%   BMI 25.35 kg/m²  Room air O2: 97    Constitutional: Anxious but nontoxic  HENT: Normocephalic, Atraumatic, Bilateral external ears normal, Oropharynx moist, No oral exudates, Nose normal.   Eyes: PERRLA, EOMI, Conjunctiva normal, No discharge.   Neck: Normal range of motion, No tenderness, Supple, No stridor.   Lymphatic: No lymphadenopathy noted.   Cardiovascular: Tachycardic heart rate, Normal rhythm, No murmurs, No rubs, No gallops.   Thorax & Lungs: Normal breath sounds, No respiratory distress, No wheezing, No chest tenderness.   Abdomen: Bowel sounds normal, Soft, No tenderness, No masses, No pulsatile masses.   Skin: Warm, Dry, No erythema, No rash.   Back: No tenderness, No CVA tenderness.   Extremities: Intact distal pulses, No edema, No tenderness, No cyanosis, No clubbing.   Musculoskeletal: Good range of motion in all major joints. No tenderness to palpation or major deformities noted.   Neurologic: Alert & oriented x 3, Normal motor function, Normal sensory function, No focal deficits noted.   Psychiatric: Anxious    COURSE & MEDICAL DECISION MAKING  Pertinent Labs & Imaging studies reviewed. (See chart for details)  This is a 19-year-old female who presents with tremors from suspected anxiety.  Initially gave the patient Phenergan and Benadryl orally.  On repeat exam she is still extremely anxious and tremulous.  Therefore the patient received intramuscular Ativan.  The patient was reexamined she continued to be tachycardic and tremulous therefore an IV was  established and the patient received a liter of fluid as well as 5 mg of Haldol.  This was finally effective and the patient has been resting comfortably.  Laboratory analysis was obtained as she did not respond to the initial treatment.  The patient does have a slight elevation in her white blood cell count however her abdomen is benign and I do not see any evidence of an infectious process.  I suspect this is all secondary to anxiety.  I will have the patient follow-up with the [facility] in 24 to 48 hours for continued outpatient management.  She will return to emerge department if she is acutely worse.    FINAL IMPRESSION  1.  Tremors  2.  Suspect secondary to anxiety    Disposition  The patient will be discharged in stable condition         Electronically signed by: Bethel Dowling, 5/12/2019 7:47 PM

## 2019-05-13 NOTE — ED TRIAGE NOTES
Chief Complaint   Patient presents with   • Anxiety   • Medication Reaction       BIB by ems for above complaint. Patient states she was seen her yesterday for pelvic pain and received a CT with contrast. After receiving the contrast patient starting to shake. Per patient she woke up this morning with anxiety and generalized shaking.     Patient placed out in lobby in wheelchair. Educated on triage process.

## 2019-05-14 LAB
APPEARANCE UR: CLEAR
COLOR UR AUTO: YELLOW
GLUCOSE UR QL STRIP.AUTO: NEGATIVE MG/DL
HCG UR QL: NEGATIVE
KETONES UR QL STRIP.AUTO: >=160 MG/DL
LEUKOCYTE ESTERASE UR QL STRIP.AUTO: NEGATIVE
NITRITE UR QL STRIP.AUTO: NEGATIVE
PH UR STRIP.AUTO: 6 [PH]
PROT UR QL STRIP: NEGATIVE MG/DL
RBC UR QL AUTO: NEGATIVE
SP GR UR: 1.02

## 2019-05-14 NOTE — DISCHARGE INSTRUCTIONS
Drink lots of fluids    Take Benadryl as needed for anxiety    Follow[facility] with [facility] as [facility]

## 2019-05-14 NOTE — ED TRIAGE NOTES
Chief Complaint   Patient presents with   • Abdominal Pain     lower abd pain x3 days, worsening   • Tremors   • Blood in Vomit     x3 this afternoon, dark red per pt     Seen several times at this facility for similar. VSS. Explained triage process, to waiting room. Asked to inform RN if questions or concerns arise.

## 2019-05-14 NOTE — ED NOTES
Pt states she is having numbness in bilat arms and hands. VSS. Pt speaking clear full sentences, no facial deficits,  equal bilat. Pt revitaled, VSS

## 2019-05-14 NOTE — ED PROVIDER NOTES
ED Provider Note    CHIEF COMPLAINT  Chief Complaint   Patient presents with   • Abdominal Pain     lower abd pain x3 days, worsening   • Tremors   • Blood in Vomit     x3 this afternoon, dark red per pt       HPI  Jaz Pinon is a 19 y.o. female who presents with abdominal discomfort.  The patient's been having recurrent abdominal pain over the last 3 days.  She states the pain is in the lower abdominal region.  This is her third visit to the emergency department.  She states the pain is severe in intensity in the lower quadrants.  She does have associated tremors.  She also has a history of anxiety.  The patient did have some bright red blood in her emesis this evening.  She states that the abdominal pain was worse with an exacerbation of the vomiting after eating at a barbecue this evening.  She does not have any change in bowel or bladder function.    REVIEW OF SYSTEMS  See HPI for further details. All other systems are negative.     PAST MEDICAL HISTORY  Past Medical History:   Diagnosis Date   • PIH (pregnancy induced hypertension), third trimester 2/1/2018   • Anxiety    • Depression     stopped taking medication 2 years ago. she has been admitted to [facility] 2 times       FAMILY HISTORY  [unfilled]    SOCIAL HISTORY  Social History     Social History   • Marital status: Single     Spouse name: N/A   • Number of children: N/A   • Years of education: N/A     Social History Main Topics   • Smoking status: Never Smoker   • Smokeless tobacco: Never Used   • Alcohol use No   • Drug use: No   • Sexual activity: Yes     Partners: Male     Birth control/ protection: Pill      Comment: Unplanned pregnancy     Other Topics Concern   • Not on file     Social History Narrative   • No narrative on file       SURGICAL HISTORY  Past Surgical History:   Procedure Laterality Date   • DENTAL SURGERY      wisdom teeth       CURRENT MEDICATIONS  Home Medications    **Home medications have not yet been reviewed for  "this encounter**         ALLERGIES  No Known Allergies    PHYSICAL EXAM  VITAL SIGNS: /81   Pulse (!) 115   Temp 36.7 °C (98 °F) (Temporal)   Resp 18   Ht 1.626 m (5' 4\")   Wt 63.5 kg (140 lb)   SpO2 97%   BMI 24.03 kg/m²       Constitutional: Anxious  HENT: Normocephalic, Atraumatic, Bilateral external ears normal, Oropharynx moist, No oral exudates, Nose normal.   Eyes: PERRLA, EOMI, Conjunctiva normal, No discharge.   Neck: Normal range of motion, No tenderness, Supple, No stridor.   Lymphatic: No lymphadenopathy noted.   Cardiovascular: Tachycardic heart rate, Normal rhythm, No murmurs, No rubs, No gallops.   Thorax & Lungs: Normal breath sounds, No respiratory distress, No wheezing, No chest tenderness.   Abdomen: Diffuse tenderness, no rebound, voluntary guarding, no distention, normal bowel sounds.   Skin: Warm, Dry, No erythema, No rash.   Back: No tenderness, No CVA tenderness.   Extremities: Intact distal pulses, No edema, No tenderness, No cyanosis, No clubbing.   Musculoskeletal: Good range of motion in all major joints. No tenderness to palpation or major deformities noted.   Neurologic: Alert & oriented x 3, Normal motor function, Normal sensory function, No focal deficits noted.   Psychiatric: Anxious    RADIOLOGY/PROCEDURES  CT-RENAL COLIC EVALUATION(A/P W/O)   Final Result         1.  No acute abnormality.            COURSE & MEDICAL DECISION MAKING  Pertinent Labs & Imaging studies reviewed. (See chart for details)  This a 19-year-old female who presents the emergency department with abdominal discomfort and tremors.  I advised the patient last night and I suspect she has a severe anxiety as ordered.  She did have some diffuse abdominal discomfort and a continued leukocytosis therefore I did reimage the patient and his CT scan does not show any acute abnormalities.  The patient's urinalysis contaminated but she does not have any urinary symptoms to support a urinary tract infection.  " The patient did receive Haldol and Benadryl and her tremors have stopped.  Her abdomen continues to be nonsurgical.  I suspect this is all from anxiety.  Otherwise the labs do not support cholecystitis and pancreatitis.  Clinically her abdomen does not appear to be obstructed.  The patient will be discharged with clear instructions to follow-up with the [facility].  I suspect her tachycardia is secondary to her anxiety.    FINAL IMPRESSION  1.  Abdominal pain  2.  Anxiety    Disposition  The patient will be discharged in stable condition         Electronically signed by: Bethel Dowling, 5/13/2019 8:31 PM

## 2019-05-14 NOTE — ED NOTES
Very anxious in triage, near end of triage while discussing plan and triage process she suddenly appeared to stare straight ahead at the wall and had brief (5-10 seconds of full body shaking). She maintained upright position in chair, began responding to commands and answering questions appropriately immediately after episode. Sister who is present in room, states that behavior was new and she had not noticed at home. Assisted to lobby by wheelchair, within line of sight of  staff.

## 2022-03-09 ENCOUNTER — TELEPHONE (OUTPATIENT)
Dept: OBGYN | Facility: CLINIC | Age: 22
End: 2022-03-09

## 2022-03-09 NOTE — TELEPHONE ENCOUNTER
"Pt called stating that she recently found out she was pregnant and was wondering if she should make an appointment. I let pt know that yes she should make an appointment to be seen. Pt also mentioned that at work she gave her HR department her lifting restrictions and was told by the head of HR, \"if I can lift these boxes, you can too\" Pt said she does not know what to do about work because they are making her carry these heavy boxes and it's causing her pain. I asked pt they are denying you even though its a Dr order? She said yes, I let her know that first we will set you up with a  appointment and then during your appointment we can ask the provider on what to do about the HR situation. Pt agreed and had no further questions at the time. I told pt that I will transfer her to Kassi to get that appointment scheduled. When I came back to my desk pt ended the call before transfer but I called Kassi so that way we can get it scheduled today. Kassi confirmed that she would call pt back.  "

## 2022-04-11 ENCOUNTER — TELEPHONE (OUTPATIENT)
Dept: OBGYN | Facility: CLINIC | Age: 22
End: 2022-04-11

## 2022-04-11 NOTE — TELEPHONE ENCOUNTER
"Attempted to call back pt regarding scheduling, unable to leave voicemail, went straight to \"call rejected.\" Will be considered New Patient since we haven't seen her since 2018 w/ [facility]. Currently not accepting New Patients at the moment.   "

## 2022-05-28 ENCOUNTER — HOSPITAL ENCOUNTER (OUTPATIENT)
Facility: MEDICAL CENTER | Age: 22
End: 2022-05-28
Attending: OBSTETRICS & GYNECOLOGY
Payer: COMMERCIAL

## 2022-05-28 LAB
ABO GROUP BLD: NORMAL
BASOPHILS # BLD AUTO: 0.3 % (ref 0–1.8)
BASOPHILS # BLD: 0.04 K/UL (ref 0–0.12)
BLD GP AB SCN SERPL QL: NORMAL
EOSINOPHIL # BLD AUTO: 0.33 K/UL (ref 0–0.51)
EOSINOPHIL NFR BLD: 2.7 % (ref 0–6.9)
ERYTHROCYTE [DISTWIDTH] IN BLOOD BY AUTOMATED COUNT: 48.2 FL (ref 35.9–50)
HBV SURFACE AG SER QL: ABNORMAL
HCT VFR BLD AUTO: 39 % (ref 37–47)
HGB BLD-MCNC: 13.2 G/DL (ref 12–16)
HIV 1+2 AB+HIV1 P24 AG SERPL QL IA: NORMAL
IMM GRANULOCYTES # BLD AUTO: 0.08 K/UL (ref 0–0.11)
IMM GRANULOCYTES NFR BLD AUTO: 0.6 % (ref 0–0.9)
LYMPHOCYTES # BLD AUTO: 2.63 K/UL (ref 1–4.8)
LYMPHOCYTES NFR BLD: 21.2 % (ref 22–41)
MCH RBC QN AUTO: 30.4 PG (ref 27–33)
MCHC RBC AUTO-ENTMCNC: 33.8 G/DL (ref 33.6–35)
MCV RBC AUTO: 89.9 FL (ref 81.4–97.8)
MONOCYTES # BLD AUTO: 0.46 K/UL (ref 0–0.85)
MONOCYTES NFR BLD AUTO: 3.7 % (ref 0–13.4)
NEUTROPHILS # BLD AUTO: 8.84 K/UL (ref 2–7.15)
NEUTROPHILS NFR BLD: 71.5 % (ref 44–72)
NRBC # BLD AUTO: 0.02 K/UL
NRBC BLD-RTO: 0.2 /100 WBC
PLATELET # BLD AUTO: 307 K/UL (ref 164–446)
PMV BLD AUTO: 10.6 FL (ref 9–12.9)
RBC # BLD AUTO: 4.34 M/UL (ref 4.2–5.4)
RH BLD: NORMAL
RUBV AB SER QL: 119 IU/ML
T PALLIDUM AB SER QL IA: ABNORMAL
WBC # BLD AUTO: 12.4 K/UL (ref 4.8–10.8)

## 2022-05-28 PROCEDURE — 87389 HIV-1 AG W/HIV-1&-2 AB AG IA: CPT

## 2022-05-28 PROCEDURE — 86901 BLOOD TYPING SEROLOGIC RH(D): CPT

## 2022-05-28 PROCEDURE — 86900 BLOOD TYPING SEROLOGIC ABO: CPT

## 2022-05-28 PROCEDURE — 86762 RUBELLA ANTIBODY: CPT

## 2022-05-28 PROCEDURE — 86850 RBC ANTIBODY SCREEN: CPT

## 2022-05-28 PROCEDURE — 87186 SC STD MICRODIL/AGAR DIL: CPT

## 2022-05-28 PROCEDURE — 87086 URINE CULTURE/COLONY COUNT: CPT

## 2022-05-28 PROCEDURE — 86592 SYPHILIS TEST NON-TREP QUAL: CPT

## 2022-05-28 PROCEDURE — 86780 TREPONEMA PALLIDUM: CPT

## 2022-05-28 PROCEDURE — 87340 HEPATITIS B SURFACE AG IA: CPT

## 2022-05-28 PROCEDURE — 85025 COMPLETE CBC W/AUTO DIFF WBC: CPT

## 2022-05-31 LAB
BACTERIA UR CULT: ABNORMAL
BACTERIA UR CULT: ABNORMAL
SIGNIFICANT IND 70042: ABNORMAL
SITE SITE: ABNORMAL
SOURCE SOURCE: ABNORMAL

## 2022-06-23 ENCOUNTER — HOSPITAL ENCOUNTER (OUTPATIENT)
Facility: MEDICAL CENTER | Age: 22
End: 2022-06-23
Attending: OBSTETRICS & GYNECOLOGY
Payer: COMMERCIAL

## 2022-06-23 PROCEDURE — 84156 ASSAY OF PROTEIN URINE: CPT

## 2022-06-23 PROCEDURE — 81050 URINALYSIS VOLUME MEASURE: CPT

## 2022-06-23 PROCEDURE — 80053 COMPREHEN METABOLIC PANEL: CPT

## 2022-06-24 LAB
ALBUMIN SERPL BCP-MCNC: 4 G/DL (ref 3.2–4.9)
ALBUMIN/GLOB SERPL: 1.4 G/DL
ALP SERPL-CCNC: 81 U/L (ref 30–99)
ALT SERPL-CCNC: 15 U/L (ref 2–50)
ANION GAP SERPL CALC-SCNC: 13 MMOL/L (ref 7–16)
AST SERPL-CCNC: 17 U/L (ref 12–45)
BILIRUB SERPL-MCNC: 0.2 MG/DL (ref 0.1–1.5)
BUN SERPL-MCNC: 6 MG/DL (ref 8–22)
CALCIUM SERPL-MCNC: 9.4 MG/DL (ref 8.5–10.5)
CHLORIDE SERPL-SCNC: 105 MMOL/L (ref 96–112)
CO2 SERPL-SCNC: 20 MMOL/L (ref 20–33)
CREAT SERPL-MCNC: 0.39 MG/DL (ref 0.5–1.4)
GFR SERPLBLD CREATININE-BSD FMLA CKD-EPI: 143 ML/MIN/1.73 M 2
GLOBULIN SER CALC-MCNC: 2.9 G/DL (ref 1.9–3.5)
GLUCOSE SERPL-MCNC: 83 MG/DL (ref 65–99)
POTASSIUM SERPL-SCNC: 3.9 MMOL/L (ref 3.6–5.5)
PROT 24H UR-MCNC: 194.4 MG/24 HR (ref 30–150)
PROT 24H UR-MRATE: 6 MG/DL (ref 0–15)
PROT SERPL-MCNC: 6.9 G/DL (ref 6–8.2)
SODIUM SERPL-SCNC: 138 MMOL/L (ref 135–145)
SPECIMEN VOL UR: ABNORMAL ML

## 2023-05-05 ENCOUNTER — APPOINTMENT (OUTPATIENT)
Dept: RADIOLOGY | Facility: MEDICAL CENTER | Age: 23
End: 2023-05-05
Attending: EMERGENCY MEDICINE
Payer: COMMERCIAL

## 2023-05-05 ENCOUNTER — HOSPITAL ENCOUNTER (EMERGENCY)
Facility: MEDICAL CENTER | Age: 23
End: 2023-05-05
Attending: EMERGENCY MEDICINE
Payer: COMMERCIAL

## 2023-05-05 VITALS
OXYGEN SATURATION: 98 % | BODY MASS INDEX: 27.7 KG/M2 | WEIGHT: 162.26 LBS | DIASTOLIC BLOOD PRESSURE: 65 MMHG | SYSTOLIC BLOOD PRESSURE: 114 MMHG | TEMPERATURE: 97.7 F | HEIGHT: 64 IN | RESPIRATION RATE: 15 BRPM | HEART RATE: 76 BPM

## 2023-05-05 DIAGNOSIS — R10.30 LOWER ABDOMINAL PAIN: ICD-10-CM

## 2023-05-05 DIAGNOSIS — R82.71 BACTERIA IN URINE: ICD-10-CM

## 2023-05-05 DIAGNOSIS — Z3A.09 9 WEEKS GESTATION OF PREGNANCY: ICD-10-CM

## 2023-05-05 DIAGNOSIS — R11.2 NAUSEA AND VOMITING, UNSPECIFIED VOMITING TYPE: ICD-10-CM

## 2023-05-05 LAB
ALBUMIN SERPL BCP-MCNC: 4 G/DL (ref 3.2–4.9)
ALBUMIN/GLOB SERPL: 1 G/DL
ALP SERPL-CCNC: 73 U/L (ref 30–99)
ALT SERPL-CCNC: 86 U/L (ref 2–50)
AMORPH CRY #/AREA URNS HPF: PRESENT /HPF
ANION GAP SERPL CALC-SCNC: 13 MMOL/L (ref 7–16)
APPEARANCE UR: ABNORMAL
AST SERPL-CCNC: 43 U/L (ref 12–45)
B-HCG SERPL-ACNC: ABNORMAL MIU/ML (ref 0–5)
BACTERIA #/AREA URNS HPF: ABNORMAL /HPF
BASOPHILS # BLD AUTO: 0.3 % (ref 0–1.8)
BASOPHILS # BLD: 0.03 K/UL (ref 0–0.12)
BILIRUB SERPL-MCNC: 0.3 MG/DL (ref 0.1–1.5)
BILIRUB UR QL STRIP.AUTO: NEGATIVE
BUN SERPL-MCNC: 7 MG/DL (ref 8–22)
CALCIUM ALBUM COR SERPL-MCNC: 9.2 MG/DL (ref 8.5–10.5)
CALCIUM SERPL-MCNC: 9.2 MG/DL (ref 8.5–10.5)
CHLORIDE SERPL-SCNC: 102 MMOL/L (ref 96–112)
CO2 SERPL-SCNC: 21 MMOL/L (ref 20–33)
COLOR UR: YELLOW
CREAT SERPL-MCNC: 0.48 MG/DL (ref 0.5–1.4)
EOSINOPHIL # BLD AUTO: 0.09 K/UL (ref 0–0.51)
EOSINOPHIL NFR BLD: 0.8 % (ref 0–6.9)
EPI CELLS #/AREA URNS HPF: ABNORMAL /HPF
ERYTHROCYTE [DISTWIDTH] IN BLOOD BY AUTOMATED COUNT: 49 FL (ref 35.9–50)
GFR SERPLBLD CREATININE-BSD FMLA CKD-EPI: 135 ML/MIN/1.73 M 2
GLOBULIN SER CALC-MCNC: 4.1 G/DL (ref 1.9–3.5)
GLUCOSE SERPL-MCNC: 109 MG/DL (ref 65–99)
GLUCOSE UR STRIP.AUTO-MCNC: NEGATIVE MG/DL
HCT VFR BLD AUTO: 42.1 % (ref 37–47)
HGB BLD-MCNC: 13.7 G/DL (ref 12–16)
HYALINE CASTS #/AREA URNS LPF: ABNORMAL /LPF
IMM GRANULOCYTES # BLD AUTO: 0.05 K/UL (ref 0–0.11)
IMM GRANULOCYTES NFR BLD AUTO: 0.5 % (ref 0–0.9)
KETONES UR STRIP.AUTO-MCNC: NEGATIVE MG/DL
LEUKOCYTE ESTERASE UR QL STRIP.AUTO: NEGATIVE
LIPASE SERPL-CCNC: 71 U/L (ref 11–82)
LYMPHOCYTES # BLD AUTO: 2.3 K/UL (ref 1–4.8)
LYMPHOCYTES NFR BLD: 21.7 % (ref 22–41)
MCH RBC QN AUTO: 28.2 PG (ref 27–33)
MCHC RBC AUTO-ENTMCNC: 32.5 G/DL (ref 33.6–35)
MCV RBC AUTO: 86.8 FL (ref 81.4–97.8)
MICRO URNS: ABNORMAL
MONOCYTES # BLD AUTO: 0.51 K/UL (ref 0–0.85)
MONOCYTES NFR BLD AUTO: 4.8 % (ref 0–13.4)
NEUTROPHILS # BLD AUTO: 7.61 K/UL (ref 2–7.15)
NEUTROPHILS NFR BLD: 71.9 % (ref 44–72)
NITRITE UR QL STRIP.AUTO: NEGATIVE
NRBC # BLD AUTO: 0 K/UL
NRBC BLD-RTO: 0 /100 WBC
NUMBER OF RH DOSES IND 8505RD: NORMAL
PH UR STRIP.AUTO: 7.5 [PH] (ref 5–8)
PLATELET # BLD AUTO: 372 K/UL (ref 164–446)
PMV BLD AUTO: 10 FL (ref 9–12.9)
POTASSIUM SERPL-SCNC: 3.7 MMOL/L (ref 3.6–5.5)
PROT SERPL-MCNC: 8.1 G/DL (ref 6–8.2)
PROT UR QL STRIP: NEGATIVE MG/DL
RBC # BLD AUTO: 4.85 M/UL (ref 4.2–5.4)
RBC # URNS HPF: ABNORMAL /HPF
RBC UR QL AUTO: NEGATIVE
RH BLD: NORMAL
SODIUM SERPL-SCNC: 136 MMOL/L (ref 135–145)
SP GR UR STRIP.AUTO: 1.02
UROBILINOGEN UR STRIP.AUTO-MCNC: 1 MG/DL
WBC # BLD AUTO: 10.6 K/UL (ref 4.8–10.8)
WBC #/AREA URNS HPF: ABNORMAL /HPF

## 2023-05-05 PROCEDURE — 700102 HCHG RX REV CODE 250 W/ 637 OVERRIDE(OP): Mod: UD | Performed by: EMERGENCY MEDICINE

## 2023-05-05 PROCEDURE — 700105 HCHG RX REV CODE 258: Mod: UD | Performed by: EMERGENCY MEDICINE

## 2023-05-05 PROCEDURE — 76801 OB US < 14 WKS SINGLE FETUS: CPT

## 2023-05-05 PROCEDURE — A9270 NON-COVERED ITEM OR SERVICE: HCPCS | Mod: UD | Performed by: EMERGENCY MEDICINE

## 2023-05-05 PROCEDURE — 85025 COMPLETE CBC W/AUTO DIFF WBC: CPT

## 2023-05-05 PROCEDURE — 96374 THER/PROPH/DIAG INJ IV PUSH: CPT

## 2023-05-05 PROCEDURE — 36415 COLL VENOUS BLD VENIPUNCTURE: CPT

## 2023-05-05 PROCEDURE — 99285 EMERGENCY DEPT VISIT HI MDM: CPT

## 2023-05-05 PROCEDURE — 86901 BLOOD TYPING SEROLOGIC RH(D): CPT

## 2023-05-05 PROCEDURE — 84702 CHORIONIC GONADOTROPIN TEST: CPT

## 2023-05-05 PROCEDURE — 80053 COMPREHEN METABOLIC PANEL: CPT

## 2023-05-05 PROCEDURE — 81001 URINALYSIS AUTO W/SCOPE: CPT

## 2023-05-05 PROCEDURE — 700111 HCHG RX REV CODE 636 W/ 250 OVERRIDE (IP): Mod: UD | Performed by: EMERGENCY MEDICINE

## 2023-05-05 PROCEDURE — 83690 ASSAY OF LIPASE: CPT

## 2023-05-05 RX ORDER — SODIUM CHLORIDE 9 MG/ML
1000 INJECTION, SOLUTION INTRAVENOUS ONCE
Status: COMPLETED | OUTPATIENT
Start: 2023-05-05 | End: 2023-05-05

## 2023-05-05 RX ORDER — ONDANSETRON 2 MG/ML
4 INJECTION INTRAMUSCULAR; INTRAVENOUS ONCE
Status: COMPLETED | OUTPATIENT
Start: 2023-05-05 | End: 2023-05-05

## 2023-05-05 RX ORDER — ACETAMINOPHEN 325 MG/1
650 TABLET ORAL ONCE
Status: COMPLETED | OUTPATIENT
Start: 2023-05-05 | End: 2023-05-05

## 2023-05-05 RX ORDER — ONDANSETRON 4 MG/1
4 TABLET, ORALLY DISINTEGRATING ORAL EVERY 8 HOURS PRN
Qty: 9 TABLET | Refills: 0 | Status: ON HOLD | OUTPATIENT
Start: 2023-05-05 | End: 2023-11-26

## 2023-05-05 RX ORDER — CEPHALEXIN 500 MG/1
500 CAPSULE ORAL 2 TIMES DAILY
Qty: 10 CAPSULE | Refills: 0 | Status: ACTIVE | OUTPATIENT
Start: 2023-05-05 | End: 2023-05-10

## 2023-05-05 RX ADMIN — ACETAMINOPHEN 650 MG: 325 TABLET, FILM COATED ORAL at 15:22

## 2023-05-05 RX ADMIN — ONDANSETRON 4 MG: 2 INJECTION INTRAMUSCULAR; INTRAVENOUS at 15:32

## 2023-05-05 RX ADMIN — SODIUM CHLORIDE 1000 ML: 9 INJECTION, SOLUTION INTRAVENOUS at 14:16

## 2023-05-05 ASSESSMENT — FIBROSIS 4 INDEX: FIB4 SCORE: 0.34

## 2023-05-05 NOTE — ED PROVIDER NOTES
ED Provider Note    CHIEF COMPLAINT  Chief Complaint   Patient presents with    Pregnancy     Pt had x 2 positive pregnancy tests at home 3 weeks ago, LMP at approx start of February. Awaiting OB appt. Unknown approximate FERNANDO.    Cramping     C/o low abdominal cramping x several months, much worse over past 1.5 weeks. +Nausea & vomiting past few days. Cramps last up to 2 hours, about 1 hour in between.         EXTERNAL RECORDS REVIEWED  Inpatient Notes ED note 5/13/19    HPI/ROS  LIMITATION TO HISTORY   Select: : None  OUTSIDE HISTORIAN(S):  None    Jaz Pinon is a 23 y.o. female who presents to the emergency department for evaluation of lower abdominal cramping in pregnancy.  The patient states that her last period was at the beginning of February.  She states that she has had 2 positive home pregnancy test and does have a follow-up appointment with an OB/GYN but has not been able to see them yet.  She states that she has had intermittent cramping that got significantly worse over the last week and a half.  She describes it as cramping and located in both lower quadrants.  It does come and go but it causes her to double over when she has the pain.  She does feel lightheaded when this happens as well.  She has been having nausea, vomiting, and diarrhea as well.  She denies any bloody or bilious emesis.  This is her fourth pregnancy and she has 3 healthy children.  She does have a history of pregnancy-induced hypertension.  She denies any fevers, runny nose, cough, congestion, or difficulty breathing.  She states that she has had some intermittent bloody noses.  She denies chest pain.  She denies dysuria or hematuria.  She has not had any vaginal bleeding or discharge.  She states that she is taking prenatal vitamins.    PAST MEDICAL HISTORY   has a past medical history of Anxiety, Depression, and PIH (pregnancy induced hypertension), third trimester (2/1/2018).    SURGICAL HISTORY   has a past surgical  "history that includes dental surgery.    FAMILY HISTORY  Family History   Problem Relation Age of Onset    No Known Problems Mother     No Known Problems Father     Hyperlipidemia Maternal Grandmother     Asthma Brother        SOCIAL HISTORY  Social History     Tobacco Use    Smoking status: Never    Smokeless tobacco: Never   Substance and Sexual Activity    Alcohol use: No    Drug use: No    Sexual activity: Yes     Partners: Male     Birth control/protection: Pill     Comment: Unplanned pregnancy       CURRENT MEDICATIONS  Home Medications       Reviewed by Shanda Galvin R.N. (Registered Nurse) on 05/05/23 at 1309  Med List Status: Not Addressed     Medication Last Dose Status   docusate sodium 100 MG Cap  Active   ferrous sulfate 325 (65 Fe) MG tablet  Active   ibuprofen (MOTRIN) 600 MG Tab  Active   Prenatal MV-Min-Fe Fum-FA-DHA (PRENATAL 1 PO)  Active   simethicone (MYLICON) 80 MG Chew Tab  Active                    ALLERGIES  No Known Allergies    PHYSICAL EXAM  VITAL SIGNS: /65   Pulse 76   Temp 36.5 °C (97.7 °F) (Temporal)   Resp 15   Ht 1.626 m (5' 4\")   Wt 73.6 kg (162 lb 4.1 oz)   LMP  (LMP Unknown)   SpO2 98%   Breastfeeding No   BMI 27.85 kg/m²   Constitutional: Alert and in no apparent distress.  HENT: Normocephalic atraumatic. Bilateral external ears normal.  Nose normal. Mucous membranes are moist.  Eyes: Pupils are equal and reactive. Conjunctiva normal. Non-icteric sclera.   Neck: Normal range of motion without tenderness. Supple. No meningeal signs.  Cardiovascular: Tachycardic rate and regular rhythm. No murmurs, gallops or rubs.  Thorax & Lungs: No retractions, nasal flaring, or tachypnea. Breath sounds are clear to auscultation bilaterally. No wheezing, rhonchi or rales.  Abdomen: Soft, nontender and nondistended. No hepatosplenomegaly.  Skin: Warm and dry. No rashes are noted.  Back: No bony tenderness, No CVA tenderness.   Extremities: 2+ peripheral pulses. Cap refill is " less than 2 seconds. No edema, cyanosis, or clubbing.  Musculoskeletal: Good range of motion in all major joints. No tenderness to palpation or major deformities noted.   Neurologic: Alert and appropriate for age. The patient moves all 4 extremities without obvious deficits.    DIAGNOSTIC STUDIES / PROCEDURES    LABS  Results for orders placed or performed during the hospital encounter of 05/05/23   CBC WITH DIFFERENTIAL   Result Value Ref Range    WBC 10.6 4.8 - 10.8 K/uL    RBC 4.85 4.20 - 5.40 M/uL    Hemoglobin 13.7 12.0 - 16.0 g/dL    Hematocrit 42.1 37.0 - 47.0 %    MCV 86.8 81.4 - 97.8 fL    MCH 28.2 27.0 - 33.0 pg    MCHC 32.5 (L) 33.6 - 35.0 g/dL    RDW 49.0 35.9 - 50.0 fL    Platelet Count 372 164 - 446 K/uL    MPV 10.0 9.0 - 12.9 fL    Neutrophils-Polys 71.90 44.00 - 72.00 %    Lymphocytes 21.70 (L) 22.00 - 41.00 %    Monocytes 4.80 0.00 - 13.40 %    Eosinophils 0.80 0.00 - 6.90 %    Basophils 0.30 0.00 - 1.80 %    Immature Granulocytes 0.50 0.00 - 0.90 %    Nucleated RBC 0.00 /100 WBC    Neutrophils (Absolute) 7.61 (H) 2.00 - 7.15 K/uL    Lymphs (Absolute) 2.30 1.00 - 4.80 K/uL    Monos (Absolute) 0.51 0.00 - 0.85 K/uL    Eos (Absolute) 0.09 0.00 - 0.51 K/uL    Baso (Absolute) 0.03 0.00 - 0.12 K/uL    Immature Granulocytes (abs) 0.05 0.00 - 0.11 K/uL    NRBC (Absolute) 0.00 K/uL   COMP METABOLIC PANEL   Result Value Ref Range    Sodium 136 135 - 145 mmol/L    Potassium 3.7 3.6 - 5.5 mmol/L    Chloride 102 96 - 112 mmol/L    Co2 21 20 - 33 mmol/L    Anion Gap 13.0 7.0 - 16.0    Glucose 109 (H) 65 - 99 mg/dL    Bun 7 (L) 8 - 22 mg/dL    Creatinine 0.48 (L) 0.50 - 1.40 mg/dL    Calcium 9.2 8.5 - 10.5 mg/dL    AST(SGOT) 43 12 - 45 U/L    ALT(SGPT) 86 (H) 2 - 50 U/L    Alkaline Phosphatase 73 30 - 99 U/L    Total Bilirubin 0.3 0.1 - 1.5 mg/dL    Albumin 4.0 3.2 - 4.9 g/dL    Total Protein 8.1 6.0 - 8.2 g/dL    Globulin 4.1 (H) 1.9 - 3.5 g/dL    A-G Ratio 1.0 g/dL   LIPASE   Result Value Ref Range    Lipase 71  11 - 82 U/L   HCG QUANTITATIVE   Result Value Ref Range    Bhcg 313770.0 (H) 0.0 - 5.0 mIU/mL   URINALYSIS,CULTURE IF INDICATED    Specimen: Urine   Result Value Ref Range    Color Yellow     Character Turbid (A)     Specific Gravity 1.020 <1.035    Ph 7.5 5.0 - 8.0    Glucose Negative Negative mg/dL    Ketones Negative Negative mg/dL    Protein Negative Negative mg/dL    Bilirubin Negative Negative    Urobilinogen, Urine 1.0 Negative    Nitrite Negative Negative    Leukocyte Esterase Negative Negative    Occult Blood Negative Negative    Micro Urine Req Microscopic    RH TYPE FOR RHOGAM FROM E.D.   Result Value Ref Range    Emergency Department Rh Typing POS     Number Of Rh Doses Indicated ZERO    CORRECTED CALCIUM   Result Value Ref Range    Correct Calcium 9.2 8.5 - 10.5 mg/dL   ESTIMATED GFR   Result Value Ref Range    GFR (CKD-EPI) 135 >60 mL/min/1.73 m 2   URINE MICROSCOPIC (W/UA)   Result Value Ref Range    WBC 0-2 /hpf    RBC 0-2 /hpf    Bacteria Few (A) None /hpf    Epithelial Cells Few /hpf    Amorphous Crystal Present /hpf    Hyaline Cast 0-2 /lpf     RADIOLOGY  Radiologist interpretation:   US-OB 1ST TRIMESTER WITH TRANSVAGINAL (COMBO)   Final Result      1.  Single living intrauterine pregnancy at 9 weeks, 4 days estimated gestational age.   2.  Small subchorionic hemorrhage.        COURSE & MEDICAL DECISION MAKING    ED Observation Status? Yes; I am placing the patient in to an observation status due to a diagnostic uncertainty as well as therapeutic intensity. Patient placed in observation status at 2:03 PM, 2023.     Observation plan is as follows: Ultrasound, labs, and reassessment    Upon Reevaluation, the patient's condition has: Improved; and will be discharged.    Patient discharged from ED Observation status at 3:21 PM (Time) 23 (Date).     INITIAL ASSESSMENT, COURSE AND PLAN  Care Narrative: This is a  23-year-old female at approximately 9 weeks presenting to the ED for evaluation  of lower abdominal cramping in pregnancy.  On initial evaluation, the patient was noted to be tachycardic with remainder vital signs were reassuring.  Repeat vital signs were improved with a heart rate of 97.  However, given her history of vomiting, she was given IV fluids.    Her abdominal exam was benign with no tenderness or distention. She denied any vaginal bleeding or discharge at this point and pelvic exam was deferred.      White count was normal and she denied any fevers.  She had no tenderness to palpation on my exam.  I am less concerned for acute appendicitis or obstruction.    Ultrasound was notable for single IUP at approximately 9 weeks and 4 days.  Additionally, a small subchorionic hemorrhage was noted.  No adnexal masses concern for ectopic pregnancy were noted.  Both ovaries had good flow with no evidence of torsion.      The patient's H&H were within normal limits and reassuring and she was hemodynamically stable.  She denied any vaginal bleeding.  Rh was performed and positive.  No RhoGAM is indicated at this point.  Quantitative hCG was 117,000.    Patient's electrolytes were reassuring without significant derangement.  Patient's ALT was minimally elevated at 86, but the remainder of her LFTs were normal.  Her lipase was also within normal limits.  I am less concern for hepatobiliary disease or acute pancreatitis especially given the lack of discomfort on my exam with palpation in the upper abdomen.  Urinalysis was notable for few bacteria and the patient will be given a prescription for Keflex to go home with.  No blood concerning for kidney stone was noted.    The patient did admit to having diarrhea in addition to vomiting.  She states that all of her children at home are sick with stomach flu type symptoms.  I suspect she may have a viral gastroenteritis.  She was treated with Zofran and was able to tolerate an oral challenge.    Upon reevaluation after fluids and Tylenol, she is resting  comfortably.  I do think she stable for discharge at this time.  I encouraged her to keep her follow-up with her OB/GYN and to return to the ED with any worsening signs or symptoms including but not limited to worsening pain, syncope, or vaginal bleeding.    The patient presents with abdominal pain without signs of peritonitis or other life-threatening or serious etiology. The patient appears stable for discharge and has been instructed to return immediately if the symptoms worsen in any way, or in 8-12hr if not improved for re-evaluation. The patient has been instructed to return if the symptoms worsen or change in any way.    HYDRATION: Based on the patient's presentation of Acute Vomiting and Tachycardia the patient was given IV fluids. IV Hydration was used because oral hydration was not adequate alone. Upon recheck following hydration, the patient was improved.      ADDITIONAL PROBLEM LIST  Abdominal pain in pregnancy  DISPOSITION AND DISCUSSIONS  I have discussed management of the patient with the following physicians and LOTTIE's:  None    Discussion of management with other QHP or appropriate source(s): None     Escalation of care considered, and ultimately not performed:IV fluids, blood analysis, diagnostic imaging, and acute inpatient care management, however at this time, the patient is most appropriate for outpatient management    Barriers to care at this time, including but not limited to:  None .     Decision tools and prescription drugs considered including, but not limited to:  None .    FINAL IMPRESSION  1. Bacteria in urine    2. 9 weeks gestation of pregnancy    3. Lower abdominal pain      PRESCRIPTIONS  New Prescriptions    CEPHALEXIN (KEFLEX) 500 MG CAP    Take 1 Capsule by mouth 2 times a day for 5 days.     FOLLOW UP  [facility]  [address] [place] [address] [place] [id number]  [phone]    Call in 1 day  To schedule a follow up appointment    [facility], Emergency  Dept  [address] [place] [id number]  [phone]  Go to   As needed      -DISCHARGE-      Electronically signed by: Lillian Moody D.O., 5/5/2023 1:43 PM

## 2023-05-05 NOTE — ED TRIAGE NOTES
Jaz Ruth Montesinos Zi  23 y.o.  female  Chief Complaint   Patient presents with    Pregnancy     Pt had x 2 positive pregnancy tests at home 3 weeks ago, LMP at approx start of February. Awaiting OB appt. Unknown approximate FERNANDO.    Cramping     C/o low abdominal cramping x several months, much worse over past 1.5 weeks. +Nausea & vomiting past few days. Cramps last up to 2 hours, about 1 hour in between.         Labs & UA ordered. Patient educated on triage process, to alert staff if any changes in condition.

## 2023-05-25 ENCOUNTER — APPOINTMENT (OUTPATIENT)
Dept: RADIOLOGY | Facility: MEDICAL CENTER | Age: 23
End: 2023-05-25
Attending: EMERGENCY MEDICINE
Payer: COMMERCIAL

## 2023-05-25 ENCOUNTER — HOSPITAL ENCOUNTER (EMERGENCY)
Facility: MEDICAL CENTER | Age: 23
End: 2023-05-25
Attending: EMERGENCY MEDICINE
Payer: COMMERCIAL

## 2023-05-25 VITALS
DIASTOLIC BLOOD PRESSURE: 83 MMHG | SYSTOLIC BLOOD PRESSURE: 121 MMHG | BODY MASS INDEX: 30.1 KG/M2 | TEMPERATURE: 98.9 F | RESPIRATION RATE: 16 BRPM | WEIGHT: 163.58 LBS | HEART RATE: 90 BPM | HEIGHT: 62 IN | OXYGEN SATURATION: 100 %

## 2023-05-25 DIAGNOSIS — R10.9 FLANK PAIN: ICD-10-CM

## 2023-05-25 LAB
ALBUMIN SERPL BCP-MCNC: 3.8 G/DL (ref 3.2–4.9)
ALBUMIN/GLOB SERPL: 1.1 G/DL
ALP SERPL-CCNC: 52 U/L (ref 30–99)
ALT SERPL-CCNC: 16 U/L (ref 2–50)
ANION GAP SERPL CALC-SCNC: 13 MMOL/L (ref 7–16)
APPEARANCE UR: ABNORMAL
AST SERPL-CCNC: 10 U/L (ref 12–45)
B-HCG SERPL-ACNC: ABNORMAL MIU/ML (ref 0–5)
BACTERIA #/AREA URNS HPF: ABNORMAL /HPF
BASOPHILS # BLD AUTO: 0.3 % (ref 0–1.8)
BASOPHILS # BLD: 0.03 K/UL (ref 0–0.12)
BILIRUB SERPL-MCNC: 0.2 MG/DL (ref 0.1–1.5)
BILIRUB UR QL STRIP.AUTO: NEGATIVE
BUN SERPL-MCNC: 8 MG/DL (ref 8–22)
CALCIUM ALBUM COR SERPL-MCNC: 9.2 MG/DL (ref 8.5–10.5)
CALCIUM SERPL-MCNC: 9 MG/DL (ref 8.5–10.5)
CHLORIDE SERPL-SCNC: 104 MMOL/L (ref 96–112)
CO2 SERPL-SCNC: 20 MMOL/L (ref 20–33)
COLOR UR: YELLOW
CREAT SERPL-MCNC: 0.41 MG/DL (ref 0.5–1.4)
EOSINOPHIL # BLD AUTO: 0.24 K/UL (ref 0–0.51)
EOSINOPHIL NFR BLD: 2.4 % (ref 0–6.9)
EPI CELLS #/AREA URNS HPF: ABNORMAL /HPF
ERYTHROCYTE [DISTWIDTH] IN BLOOD BY AUTOMATED COUNT: 44.9 FL (ref 35.9–50)
GFR SERPLBLD CREATININE-BSD FMLA CKD-EPI: 140 ML/MIN/1.73 M 2
GLOBULIN SER CALC-MCNC: 3.5 G/DL (ref 1.9–3.5)
GLUCOSE SERPL-MCNC: 110 MG/DL (ref 65–99)
GLUCOSE UR STRIP.AUTO-MCNC: NEGATIVE MG/DL
HCT VFR BLD AUTO: 38.5 % (ref 37–47)
HGB BLD-MCNC: 13.1 G/DL (ref 12–16)
HYALINE CASTS #/AREA URNS LPF: ABNORMAL /LPF
IMM GRANULOCYTES # BLD AUTO: 0.03 K/UL (ref 0–0.11)
IMM GRANULOCYTES NFR BLD AUTO: 0.3 % (ref 0–0.9)
KETONES UR STRIP.AUTO-MCNC: NEGATIVE MG/DL
LEUKOCYTE ESTERASE UR QL STRIP.AUTO: NEGATIVE
LIPASE SERPL-CCNC: 76 U/L (ref 11–82)
LYMPHOCYTES # BLD AUTO: 2.73 K/UL (ref 1–4.8)
LYMPHOCYTES NFR BLD: 27.5 % (ref 22–41)
MCH RBC QN AUTO: 28.7 PG (ref 27–33)
MCHC RBC AUTO-ENTMCNC: 34 G/DL (ref 32.2–35.5)
MCV RBC AUTO: 84.4 FL (ref 81.4–97.8)
MICRO URNS: ABNORMAL
MONOCYTES # BLD AUTO: 0.42 K/UL (ref 0–0.85)
MONOCYTES NFR BLD AUTO: 4.2 % (ref 0–13.4)
NEUTROPHILS # BLD AUTO: 6.48 K/UL (ref 1.82–7.42)
NEUTROPHILS NFR BLD: 65.3 % (ref 44–72)
NITRITE UR QL STRIP.AUTO: NEGATIVE
NRBC # BLD AUTO: 0 K/UL
NRBC BLD-RTO: 0 /100 WBC (ref 0–0.2)
PH UR STRIP.AUTO: 7 [PH] (ref 5–8)
PLATELET # BLD AUTO: 304 K/UL (ref 164–446)
PMV BLD AUTO: 10.1 FL (ref 9–12.9)
POTASSIUM SERPL-SCNC: 3.5 MMOL/L (ref 3.6–5.5)
PROT SERPL-MCNC: 7.3 G/DL (ref 6–8.2)
PROT UR QL STRIP: NEGATIVE MG/DL
RBC # BLD AUTO: 4.56 M/UL (ref 4.2–5.4)
RBC # URNS HPF: ABNORMAL /HPF
RBC UR QL AUTO: NEGATIVE
SODIUM SERPL-SCNC: 137 MMOL/L (ref 135–145)
SP GR UR STRIP.AUTO: 1.02
UROBILINOGEN UR STRIP.AUTO-MCNC: 0.2 MG/DL
WBC # BLD AUTO: 9.9 K/UL (ref 4.8–10.8)
WBC #/AREA URNS HPF: ABNORMAL /HPF

## 2023-05-25 PROCEDURE — 85025 COMPLETE CBC W/AUTO DIFF WBC: CPT

## 2023-05-25 PROCEDURE — 76775 US EXAM ABDO BACK WALL LIM: CPT

## 2023-05-25 PROCEDURE — 99284 EMERGENCY DEPT VISIT MOD MDM: CPT

## 2023-05-25 PROCEDURE — 84702 CHORIONIC GONADOTROPIN TEST: CPT

## 2023-05-25 PROCEDURE — 83690 ASSAY OF LIPASE: CPT

## 2023-05-25 PROCEDURE — 80053 COMPREHEN METABOLIC PANEL: CPT

## 2023-05-25 PROCEDURE — 36415 COLL VENOUS BLD VENIPUNCTURE: CPT

## 2023-05-25 PROCEDURE — 81001 URINALYSIS AUTO W/SCOPE: CPT

## 2023-05-25 ASSESSMENT — FIBROSIS 4 INDEX: FIB4 SCORE: 0.3

## 2023-05-25 NOTE — ED TRIAGE NOTES
"Chief Complaint   Patient presents with    Flank Pain     L flank pain starting 2 days ago, pain radiates to the back.     Endorses fevers(unknown temp). Denies N/V          BP (!) 152/87   Pulse (!) 107   Temp 36.9 °C (98.5 °F) (Temporal)   Resp 18   Ht 1.575 m (5' 2\")   Wt 74.2 kg (163 lb 9.3 oz)   LMP  (LMP Unknown)   SpO2 98%   BMI 29.92 kg/m²     13wks pregnant   "

## 2023-05-25 NOTE — DISCHARGE INSTRUCTIONS
Take Tylenol for flank pain.  We have not made a definitive diagnosis however there is no evidence of kidney stone and your intrauterine pregnancy with good yesterday on ultrasound and there is no evidence of urinary tract infection or abnormal lab work

## 2023-05-25 NOTE — ED PROVIDER NOTES
ER Provider Note    Scribed for Сергей James M.d. by Negar Mehta. 5/25/2023  3:04 PM    Primary Care Provider: Pcp Pt States None    CHIEF COMPLAINT  Chief Complaint   Patient presents with    Flank Pain     L flank pain starting 2 days ago, pain radiates to the back.     Endorses fevers(unknown temp). Denies N/V          EXTERNAL RECORDS REVIEWED  Inpatient Notes patient has postpartum visit in 2018 reviewed by Dr. Mclain    HPI/EVE  LIMITATION TO HISTORY   Select: : None  OUTSIDE HISTORIAN(S):  Friend  at bedside to confirm sequence of events and collateral information provided.     Jaz Pinon is a 23 y.o. female who presents to the ED complaining of for evaluation of left flank pain onset 2 days ago. The patient states she also has fever, and back pain, but denies any difficulty urinating, nausea, or vomiting. She reports that the pain radiates to her left hip and to her lower back. She notes that she had no trauma. She describes that she has had positive pregnancy tests. She reports that she is about 12 weeks pregnant. She also notes that she has an ultrasound yesterday. She was seen on 05/05/2023 for similar symptoms. A urinalysis was performed and she states there was an elevated bacteria count, so she was prescribed medication. The medication alleviated her pain, but it has returned.     PAST MEDICAL HISTORY  Past Medical History:   Diagnosis Date    Anxiety     Depression     stopped taking medication 2 years ago. she has been admitted to [facility] 2 times    PIH (pregnancy induced hypertension), third trimester 2/1/2018     SURGICAL HISTORY  Past Surgical History:   Procedure Laterality Date    DENTAL SURGERY      wisdom teeth     FAMILY HISTORY  Family History   Problem Relation Age of Onset    No Known Problems Mother     No Known Problems Father     Hyperlipidemia Maternal Grandmother     Asthma Brother      SOCIAL HISTORY   reports that she has never smoked. She has  "never used smokeless tobacco. She reports that she does not drink alcohol and does not use drugs.    CURRENT MEDICATIONS  Previous Medications    DOCUSATE SODIUM 100 MG CAP    Take 100 mg by mouth 2 times a day as needed for Constipation.    FERROUS SULFATE 325 (65 FE) MG TABLET    Take 1 Tab by mouth 2 times a day, with meals.    IBUPROFEN (MOTRIN) 600 MG TAB    Take 1 Tab by mouth every 6 hours as needed for Mild Pain.    ONDANSETRON (ZOFRAN ODT) 4 MG TABLET DISPERSIBLE    Take 1 Tablet by mouth every 8 hours as needed for Nausea/Vomiting.    PRENATAL MV-MIN-FE FUM-FA-DHA (PRENATAL 1 PO)    Take  by mouth.    SIMETHICONE (MYLICON) 80 MG CHEW TAB    Take 1 Tab by mouth every 6 hours as needed (gas/bloating).     ALLERGIES  Patient has no known allergies.    PHYSICAL EXAM  BP (!) 152/87   Pulse (!) 107   Temp 36.9 °C (98.5 °F) (Temporal)   Resp 18   Ht 1.575 m (5' 2\")   Wt 74.2 kg (163 lb 9.3 oz)   LMP  (LMP Unknown)   SpO2 98%   BMI 29.92 kg/m²     Constitutional: Well developed, Well nourished, No acute distress, Non-toxic appearance.   HENT: Normocephalic, Atraumatic, Bilateral external ears normal, Oropharynx is clear mucous membranes are moist. No oral exudates or nasal discharge.   Eyes: Pupils are equal round and reactive, EOMI, Conjunctiva normal, No discharge.   Neck: Normal range of motion, No tenderness, Supple, No stridor. No meningismus.  Lymphatic: No lymphadenopathy noted.   Cardiovascular: Regular rate and rhythm without murmur rub or gallop.  Thorax & Lungs: Clear breath sounds bilaterally without wheezes, rhonchi or rales. There is no chest wall tenderness.   Abdomen: Soft non-tender non-distended. There is no rebound or guarding. No organomegaly is appreciated. Bowel sounds are normal.  Skin: Normal without rash.   Back: No CVA or spinal tenderness.   Extremities: Intact distal pulses, No edema, No tenderness, No cyanosis, No clubbing. Capillary refill is less than 2 " seconds.  Musculoskeletal: Good range of motion in all major joints. No tenderness to palpation or major deformities noted.   Neurologic: Alert & oriented x 3, Normal motor function, Normal sensory function, No focal deficits noted. Reflexes are normal.  Psychiatric: Affect normal, Judgment normal, Mood normal. There is no suicidal ideation or patient reported hallucinations.      DIAGNOSTIC STUDIES    Labs:   Labs Reviewed   COMP METABOLIC PANEL - Abnormal; Notable for the following components:       Result Value    Potassium 3.5 (*)     Glucose 110 (*)     Creatinine 0.41 (*)     AST(SGOT) 10 (*)     All other components within normal limits   HCG QUANTITATIVE - Abnormal; Notable for the following components:    Bhcg 54811.0 (*)     All other components within normal limits   URINALYSIS,CULTURE IF INDICATED - Abnormal; Notable for the following components:    Character Cloudy (*)     All other components within normal limits    Narrative:     Indication for culture:->Pregnant women: fever and/or  asymptomatic screening   URINE MICROSCOPIC (W/UA) - Abnormal; Notable for the following components:    Bacteria Few (*)     Epithelial Cells Moderate (*)     All other components within normal limits    Narrative:     Indication for culture:->Pregnant women: fever and/or  asymptomatic screening   CBC WITH DIFFERENTIAL   LIPASE   ESTIMATED GFR   CORRECTED CALCIUM     Radiology:   The attending emergency physician has independently interpreted the diagnostic imaging associated with this visit and am waiting the final reading from the radiologist.   Preliminary interpretation is a follows: No hydro  Radiologist interpretation:   US-RENAL   Final Result      Unremarkable kidneys. No hydronephrosis.         COURSE & MEDICAL DECISION MAKING     ED Observation Status? Yes; I am placing the patient in to an observation status due to a diagnostic uncertainty as well as therapeutic intensity. Patient placed in observation status at  3:09 PM, 5/25/2023.     Observation plan is as follows: Lab work and imaging will be performed for further evaluation of her symptoms.      Upon Reevaluation, the patient's condition has: Improved; and will be discharged.    Patient discharged from ED Observation status at 4:27 PM (Time) 5/25/2023 (Date).     INITIAL ASSESSMENT, COURSE AND PLAN  Care Narrative: Had a moderate suspicion for kidney stone versus urinary tract infection on this patient who has had some symptomatology for a number of days and had recent ultrasound yesterday confirming intrauterine pregnancy without issue.  She does not have any vomiting to suggest pyelonephritis    3:07 PM - Patient seen and examined at bedside. Discussed plan of care, including lab work and imaging. Patient agrees to the plan of care. Ordered for Urinalysis, HCG Quantitative, Lipase, CMP. CBC w/ Diff., and US-Renal to evaluate her symptoms.      Ultrasound shows no evidence of hydronephrosis or indirect evidence of kidney stone    Urinalysis shows no infection and laboratory evaluation is otherwise unremarkable.    4:27 PM - Patient was reevaluated at bedside. Discussed lab and radiology results with the patient and informed them that she does not have kidney stones and she is not having an ectopic pregnancy. I also informed her that her urine results were reassuring and she does not have a urinary tract infection. Discussed discharge instructions and return precautions with the patient and they were cleared for discharge. Patient was given the opportunity to ask any further questions. She is comfortable with discharge at this time.         DISPOSITION AND DISCUSSIONS  I have discussed management of the patient with the following physicians and LOTTIE's:  None.    Discussion of management with other QHP or appropriate source(s): None     Barriers to care at this time, including but not limited to: Patient does not have established PCP.     Decision tools and prescription  drugs considered including, but not limited to: Antibiotics considered but does not appear to be cystitis present .    The patient will return for new or worsening symptoms and is stable at the time of discharge.    DISPOSITION:  Patient will be discharged home in stable condition.    FOLLOW UP:  No follow-up provider specified.    FINAL DIAGNOSIS  1. Flank pain    2.  Intrauterine pregnancy     Negar MALDONADO (Scribe), am scribing for, and in the presence of, Сергей James M.D..    Electronically signed by: Negar Mehta (Scribe), 5/25/2023    Сергей MALDONADO M.D. personally performed the services described in this documentation, as scribed by Negar Mehta in my presence, and it is both accurate and complete.      The note accurately reflects work and decisions made by me.  Сергей James M.D.  5/25/2023  4:49 PM

## 2023-05-26 NOTE — ED NOTES
Assumed pt care, first contact with pt at discharge. Hcg noted to be higher on last ED visit than today. ERP notified. ERP rounded at bedside, instructed pt to f/u with OBGYN, no new orders. Pt states that she had an US yesterday confirming IUP.     Discharge instructions and follow up care discussed with patient. Patient given time to ask questions and verbalized understanding. Patient AOx4 at discharge and ambulatory to lobby with steady gait.

## 2023-07-20 ENCOUNTER — OFFICE VISIT (OUTPATIENT)
Dept: OBGYN | Facility: CLINIC | Age: 23
End: 2023-07-20
Payer: COMMERCIAL

## 2023-07-20 VITALS — WEIGHT: 173.8 LBS | BODY MASS INDEX: 31.79 KG/M2 | SYSTOLIC BLOOD PRESSURE: 118 MMHG | DIASTOLIC BLOOD PRESSURE: 67 MMHG

## 2023-07-20 DIAGNOSIS — O35.9XX0 KNOWN FETAL ANOMALY, ANTEPARTUM, NOT APPLICABLE OR UNSPECIFIED FETUS: ICD-10-CM

## 2023-07-20 DIAGNOSIS — O09.292 HX OF FETAL ANOMALY IN PRIOR PREGNANCY, CURRENTLY PREGNANT, SECOND TRIMESTER: ICD-10-CM

## 2023-07-20 PROCEDURE — 99202 OFFICE O/P NEW SF 15 MIN: CPT | Performed by: OBSTETRICS & GYNECOLOGY

## 2023-07-20 PROCEDURE — 76817 TRANSVAGINAL US OBSTETRIC: CPT | Performed by: OBSTETRICS & GYNECOLOGY

## 2023-07-20 PROCEDURE — 76811 OB US DETAILED SNGL FETUS: CPT | Performed by: OBSTETRICS & GYNECOLOGY

## 2023-07-20 PROCEDURE — 3074F SYST BP LT 130 MM HG: CPT | Performed by: OBSTETRICS & GYNECOLOGY

## 2023-07-20 PROCEDURE — 3078F DIAST BP <80 MM HG: CPT | Performed by: OBSTETRICS & GYNECOLOGY

## 2023-07-20 ASSESSMENT — FIBROSIS 4 INDEX: FIB4 SCORE: 0.2

## 2023-07-23 NOTE — PROGRESS NOTES
"PYELECTASIS  She was informed of the finding, pyelectasis which is seen in 3% of ultrasounds and affects male more commonly the females 4:1.  The most common etiology is reflux.  In over 90% of cases, this will resolve by the  period.  We discussed the role of serial ultrasound to monitor this condition.       Pyelectasis is a \"soft sign\" for T21 which means pyelectasis can be seen in fetuses who are normal but also in those with T21. It is not diagnostic for T21.  Since her cfDNA was low risk and the NPV for cfDNA is extremely high, the likelihood of T21 is extremely low.  She was satisfied with this explanation.     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  Having a child with cleft lip increases the risk of subsequent offspring to about 4% with a reported range of 2 to 8%.  The role of prenatal ultrasound as well as the limitations were discussed.    Expanded problem focused consultation with straight forward decision making.  "

## 2023-09-14 ENCOUNTER — OFFICE VISIT (OUTPATIENT)
Dept: OBGYN | Facility: CLINIC | Age: 23
End: 2023-09-14
Payer: COMMERCIAL

## 2023-09-14 VITALS — DIASTOLIC BLOOD PRESSURE: 78 MMHG | SYSTOLIC BLOOD PRESSURE: 132 MMHG | WEIGHT: 190.7 LBS | BODY MASS INDEX: 34.88 KG/M2

## 2023-09-14 DIAGNOSIS — Z3A.28 28 WEEKS GESTATION OF PREGNANCY: ICD-10-CM

## 2023-09-14 DIAGNOSIS — Z82.79 FAMILY HISTORY OF CLEFT LIP: ICD-10-CM

## 2023-09-14 DIAGNOSIS — O35.EXX0 ENCOUNTER FOR REPEAT ULTRASOUND OF FETAL PYELECTASIS, ANTEPARTUM, NOT APPLICABLE OR UNSPECIFIED FETUS: ICD-10-CM

## 2023-09-14 PROCEDURE — 3078F DIAST BP <80 MM HG: CPT | Performed by: OBSTETRICS & GYNECOLOGY

## 2023-09-14 PROCEDURE — 3075F SYST BP GE 130 - 139MM HG: CPT | Performed by: OBSTETRICS & GYNECOLOGY

## 2023-09-14 PROCEDURE — 76816 OB US FOLLOW-UP PER FETUS: CPT | Performed by: OBSTETRICS & GYNECOLOGY

## 2023-09-14 ASSESSMENT — FIBROSIS 4 INDEX: FIB4 SCORE: 0.2

## 2023-10-19 ENCOUNTER — ANCILLARY PROCEDURE (OUTPATIENT)
Dept: MATERNAL FETAL MEDICINE | Facility: MEDICAL CENTER | Age: 23
End: 2023-10-19
Payer: COMMERCIAL

## 2023-10-19 VITALS — BODY MASS INDEX: 36.42 KG/M2 | SYSTOLIC BLOOD PRESSURE: 132 MMHG | DIASTOLIC BLOOD PRESSURE: 83 MMHG | WEIGHT: 199.1 LBS

## 2023-10-19 DIAGNOSIS — O35.EXX1 ULTRASOUND RECHECK OF FETAL PYELECTASIS, ANTEPARTUM, FETUS 1: ICD-10-CM

## 2023-10-19 PROCEDURE — 76816 OB US FOLLOW-UP PER FETUS: CPT | Performed by: OBSTETRICS & GYNECOLOGY

## 2023-10-19 ASSESSMENT — FIBROSIS 4 INDEX: FIB4 SCORE: 0.2

## 2023-10-30 ENCOUNTER — HOSPITAL ENCOUNTER (EMERGENCY)
Facility: MEDICAL CENTER | Age: 23
End: 2023-10-30
Attending: OBSTETRICS & GYNECOLOGY | Admitting: OBSTETRICS & GYNECOLOGY
Payer: COMMERCIAL

## 2023-10-30 VITALS
DIASTOLIC BLOOD PRESSURE: 75 MMHG | WEIGHT: 206 LBS | RESPIRATION RATE: 16 BRPM | HEART RATE: 106 BPM | BODY MASS INDEX: 35.17 KG/M2 | SYSTOLIC BLOOD PRESSURE: 129 MMHG | TEMPERATURE: 97.2 F | HEIGHT: 64 IN

## 2023-10-30 PROCEDURE — 59025 FETAL NON-STRESS TEST: CPT

## 2023-10-30 PROCEDURE — 302449 STATCHG TRIAGE ONLY (STATISTIC)

## 2023-10-30 ASSESSMENT — FIBROSIS 4 INDEX: FIB4 SCORE: 0.2

## 2023-10-31 NOTE — PROGRESS NOTES
1730- pt presented to [facility] c/o ctx that started this morning. Pt denies vaginalk bleeding or leaking. Fm+ POC discussed monitors placed on pt x 2.    1827- dr brown at bedside to asses pt. Said pt may be d/c home. Pt v/u of d/c instructions and has no concerns at this time.

## 2023-11-12 ENCOUNTER — HOSPITAL ENCOUNTER (EMERGENCY)
Facility: MEDICAL CENTER | Age: 23
End: 2023-11-12
Attending: OBSTETRICS & GYNECOLOGY | Admitting: OBSTETRICS & GYNECOLOGY
Payer: COMMERCIAL

## 2023-11-12 VITALS
OXYGEN SATURATION: 96 % | RESPIRATION RATE: 16 BRPM | WEIGHT: 204 LBS | SYSTOLIC BLOOD PRESSURE: 124 MMHG | DIASTOLIC BLOOD PRESSURE: 76 MMHG | BODY MASS INDEX: 34.83 KG/M2 | HEIGHT: 64 IN | TEMPERATURE: 97.4 F | HEART RATE: 120 BPM

## 2023-11-12 PROCEDURE — 59025 FETAL NON-STRESS TEST: CPT

## 2023-11-12 PROCEDURE — 302449 STATCHG TRIAGE ONLY (STATISTIC)

## 2023-11-12 ASSESSMENT — FIBROSIS 4 INDEX: FIB4 SCORE: 0.2

## 2023-11-13 NOTE — PROGRESS NOTES
1840: Pt is a  at 37.1 weeks today, pt presents to L&D with c/o painful Uc's every 5-7 minutes today. Pt reports decreased but +FM, denies VB/LOF at this time. EFM and TOCO applied, VS taken, pt comfortable in bed at this time.     : SVE 1-2/thick, see flowsheets for details    : Report given to Ema HUFF, POC discussed

## 2023-11-13 NOTE — PROGRESS NOTES
1900- Received report from REFUGIO Beckford.     2007- Phone call to Dr. Fajardo. Recent SVE unchanged. Did discuss that no presenting part was felt by 3 nurses. Fetal tracing reviewed by provider. Received orders to discharge patient.     2038- Received discharge orders from provider. Discharge instructions discussed, patient verbalized understanding. UC's, ROM, VB, abn FM, when to return to L&D discussed. Patient escorted off unit without any complications.

## 2023-11-16 ENCOUNTER — HOSPITAL ENCOUNTER (OUTPATIENT)
Facility: MEDICAL CENTER | Age: 23
End: 2023-11-16
Attending: OBSTETRICS & GYNECOLOGY | Admitting: OBSTETRICS & GYNECOLOGY
Payer: COMMERCIAL

## 2023-11-16 VITALS
TEMPERATURE: 98.4 F | DIASTOLIC BLOOD PRESSURE: 67 MMHG | RESPIRATION RATE: 16 BRPM | HEIGHT: 64 IN | SYSTOLIC BLOOD PRESSURE: 119 MMHG | HEART RATE: 117 BPM | WEIGHT: 206 LBS | BODY MASS INDEX: 35.17 KG/M2

## 2023-11-16 LAB — A1 MICROGLOB PLACENTAL VAG QL: NEGATIVE

## 2023-11-16 PROCEDURE — 59025 FETAL NON-STRESS TEST: CPT

## 2023-11-16 PROCEDURE — 84112 EVAL AMNIOTIC FLUID PROTEIN: CPT

## 2023-11-16 ASSESSMENT — FIBROSIS 4 INDEX: FIB4 SCORE: 0.2

## 2023-11-16 NOTE — PROGRESS NOTES
Pt presented to [facility] c/o of leaking that started today after MD appointment. Pt deniesvaginal bleeding or ctx. FM+. Pt placed on monitors x 2. POC discussed    1700- dr brown updated on pt status. Said pt may be d/c home. Pt v/u of d/c instructions and has no concerns at this time.

## 2023-11-25 ENCOUNTER — ANESTHESIA (OUTPATIENT)
Dept: ANESTHESIOLOGY | Facility: MEDICAL CENTER | Age: 23
End: 2023-11-25
Payer: COMMERCIAL

## 2023-11-25 ENCOUNTER — ANESTHESIA EVENT (OUTPATIENT)
Dept: ANESTHESIOLOGY | Facility: MEDICAL CENTER | Age: 23
End: 2023-11-25
Payer: COMMERCIAL

## 2023-11-25 ENCOUNTER — HOSPITAL ENCOUNTER (INPATIENT)
Facility: MEDICAL CENTER | Age: 23
LOS: 1 days | End: 2023-11-26
Attending: OBSTETRICS & GYNECOLOGY | Admitting: OBSTETRICS & GYNECOLOGY
Payer: COMMERCIAL

## 2023-11-25 ENCOUNTER — APPOINTMENT (OUTPATIENT)
Dept: OBGYN | Facility: MEDICAL CENTER | Age: 23
End: 2023-11-25
Attending: OBSTETRICS & GYNECOLOGY
Payer: COMMERCIAL

## 2023-11-25 LAB
BASOPHILS # BLD AUTO: 0.3 % (ref 0–1.8)
BASOPHILS # BLD: 0.03 K/UL (ref 0–0.12)
EOSINOPHIL # BLD AUTO: 0.13 K/UL (ref 0–0.51)
EOSINOPHIL NFR BLD: 1.2 % (ref 0–6.9)
ERYTHROCYTE [DISTWIDTH] IN BLOOD BY AUTOMATED COUNT: 54.4 FL (ref 35.9–50)
HCT VFR BLD AUTO: 35.8 % (ref 37–47)
HGB BLD-MCNC: 11.2 G/DL (ref 12–16)
HOLDING TUBE BB 8507: NORMAL
IMM GRANULOCYTES # BLD AUTO: 0.07 K/UL (ref 0–0.11)
IMM GRANULOCYTES NFR BLD AUTO: 0.7 % (ref 0–0.9)
LYMPHOCYTES # BLD AUTO: 2.41 K/UL (ref 1–4.8)
LYMPHOCYTES NFR BLD: 23 % (ref 22–41)
MCH RBC QN AUTO: 25.5 PG (ref 27–33)
MCHC RBC AUTO-ENTMCNC: 31.3 G/DL (ref 32.2–35.5)
MCV RBC AUTO: 81.5 FL (ref 81.4–97.8)
MONOCYTES # BLD AUTO: 0.57 K/UL (ref 0–0.85)
MONOCYTES NFR BLD AUTO: 5.4 % (ref 0–13.4)
NEUTROPHILS # BLD AUTO: 7.26 K/UL (ref 1.82–7.42)
NEUTROPHILS NFR BLD: 69.4 % (ref 44–72)
NRBC # BLD AUTO: 0.03 K/UL
NRBC BLD-RTO: 0.3 /100 WBC (ref 0–0.2)
PLATELET # BLD AUTO: 279 K/UL (ref 164–446)
PMV BLD AUTO: 11.3 FL (ref 9–12.9)
RBC # BLD AUTO: 4.39 M/UL (ref 4.2–5.4)
T PALLIDUM AB SER QL IA: NORMAL
WBC # BLD AUTO: 10.5 K/UL (ref 4.8–10.8)

## 2023-11-25 PROCEDURE — 304965 HCHG RECOVERY SERVICES

## 2023-11-25 PROCEDURE — A9270 NON-COVERED ITEM OR SERVICE: HCPCS | Performed by: OBSTETRICS & GYNECOLOGY

## 2023-11-25 PROCEDURE — 700105 HCHG RX REV CODE 258: Performed by: OBSTETRICS & GYNECOLOGY

## 2023-11-25 PROCEDURE — 59409 OBSTETRICAL CARE: CPT

## 2023-11-25 PROCEDURE — 10907ZC DRAINAGE OF AMNIOTIC FLUID, THERAPEUTIC FROM PRODUCTS OF CONCEPTION, VIA NATURAL OR ARTIFICIAL OPENING: ICD-10-PCS | Performed by: OBSTETRICS & GYNECOLOGY

## 2023-11-25 PROCEDURE — 36415 COLL VENOUS BLD VENIPUNCTURE: CPT

## 2023-11-25 PROCEDURE — 3E033VJ INTRODUCTION OF OTHER HORMONE INTO PERIPHERAL VEIN, PERCUTANEOUS APPROACH: ICD-10-PCS | Performed by: OBSTETRICS & GYNECOLOGY

## 2023-11-25 PROCEDURE — 700111 HCHG RX REV CODE 636 W/ 250 OVERRIDE (IP): Mod: JZ | Performed by: OBSTETRICS & GYNECOLOGY

## 2023-11-25 PROCEDURE — 86780 TREPONEMA PALLIDUM: CPT

## 2023-11-25 PROCEDURE — 3E0P7VZ INTRODUCTION OF HORMONE INTO FEMALE REPRODUCTIVE, VIA NATURAL OR ARTIFICIAL OPENING: ICD-10-PCS | Performed by: OBSTETRICS & GYNECOLOGY

## 2023-11-25 PROCEDURE — 700102 HCHG RX REV CODE 250 W/ 637 OVERRIDE(OP): Performed by: OBSTETRICS & GYNECOLOGY

## 2023-11-25 PROCEDURE — 700111 HCHG RX REV CODE 636 W/ 250 OVERRIDE (IP): Mod: JZ

## 2023-11-25 PROCEDURE — 770002 HCHG ROOM/CARE - OB PRIVATE (112)

## 2023-11-25 PROCEDURE — 303615 HCHG EPIDURAL/SPINAL ANESTHESIA FOR LABOR

## 2023-11-25 PROCEDURE — 85025 COMPLETE CBC W/AUTO DIFF WBC: CPT

## 2023-11-25 RX ORDER — VITAMIN A ACETATE, BETA CAROTENE, ASCORBIC ACID, CHOLECALCIFEROL, .ALPHA.-TOCOPHEROL ACETATE, DL-, THIAMINE MONONITRATE, RIBOFLAVIN, NIACINAMIDE, PYRIDOXINE HYDROCHLORIDE, FOLIC ACID, CYANOCOBALAMIN, CALCIUM CARBONATE, FERROUS FUMARATE, ZINC OXIDE, CUPRIC OXIDE 3080; 12; 120; 400; 1; 1.84; 3; 20; 22; 920; 25; 200; 27; 10; 2 [IU]/1; UG/1; MG/1; [IU]/1; MG/1; MG/1; MG/1; MG/1; MG/1; [IU]/1; MG/1; MG/1; MG/1; MG/1; MG/1
1 TABLET, FILM COATED ORAL
Status: DISCONTINUED | OUTPATIENT
Start: 2023-11-25 | End: 2023-11-26 | Stop reason: HOSPADM

## 2023-11-25 RX ORDER — ROPIVACAINE HYDROCHLORIDE 2 MG/ML
INJECTION, SOLUTION EPIDURAL; INFILTRATION; PERINEURAL CONTINUOUS
Status: CANCELLED | OUTPATIENT
Start: 2023-11-25

## 2023-11-25 RX ORDER — MISOPROSTOL 200 UG/1
600 TABLET ORAL
Status: DISCONTINUED | OUTPATIENT
Start: 2023-11-25 | End: 2023-11-26 | Stop reason: HOSPADM

## 2023-11-25 RX ORDER — TERBUTALINE SULFATE 1 MG/ML
0.25 INJECTION, SOLUTION SUBCUTANEOUS
Status: DISCONTINUED | OUTPATIENT
Start: 2023-11-25 | End: 2023-11-25 | Stop reason: HOSPADM

## 2023-11-25 RX ORDER — SIMETHICONE 125 MG
125 TABLET,CHEWABLE ORAL 4 TIMES DAILY PRN
Status: DISCONTINUED | OUTPATIENT
Start: 2023-11-25 | End: 2023-11-26 | Stop reason: HOSPADM

## 2023-11-25 RX ORDER — ONDANSETRON 2 MG/ML
4 INJECTION INTRAMUSCULAR; INTRAVENOUS EVERY 6 HOURS PRN
Status: DISCONTINUED | OUTPATIENT
Start: 2023-11-25 | End: 2023-11-25 | Stop reason: HOSPADM

## 2023-11-25 RX ORDER — ACETAMINOPHEN 500 MG
1000 TABLET ORAL EVERY 6 HOURS PRN
Status: DISCONTINUED | OUTPATIENT
Start: 2023-11-25 | End: 2023-11-26 | Stop reason: HOSPADM

## 2023-11-25 RX ORDER — EPHEDRINE SULFATE 50 MG/ML
5 INJECTION, SOLUTION INTRAVENOUS
Status: CANCELLED | OUTPATIENT
Start: 2023-11-25

## 2023-11-25 RX ORDER — LIDOCAINE HYDROCHLORIDE 10 MG/ML
20 INJECTION, SOLUTION INFILTRATION; PERINEURAL
Status: DISCONTINUED | OUTPATIENT
Start: 2023-11-25 | End: 2023-11-25 | Stop reason: HOSPADM

## 2023-11-25 RX ORDER — ACETAMINOPHEN 500 MG
1000 TABLET ORAL
Status: DISCONTINUED | OUTPATIENT
Start: 2023-11-25 | End: 2023-11-25 | Stop reason: HOSPADM

## 2023-11-25 RX ORDER — SODIUM CHLORIDE, SODIUM LACTATE, POTASSIUM CHLORIDE, AND CALCIUM CHLORIDE .6; .31; .03; .02 G/100ML; G/100ML; G/100ML; G/100ML
1000 INJECTION, SOLUTION INTRAVENOUS
Status: CANCELLED | OUTPATIENT
Start: 2023-11-25

## 2023-11-25 RX ORDER — ONDANSETRON 4 MG/1
4 TABLET, ORALLY DISINTEGRATING ORAL EVERY 6 HOURS PRN
Status: DISCONTINUED | OUTPATIENT
Start: 2023-11-25 | End: 2023-11-25 | Stop reason: HOSPADM

## 2023-11-25 RX ORDER — SODIUM CHLORIDE, SODIUM LACTATE, POTASSIUM CHLORIDE, CALCIUM CHLORIDE 600; 310; 30; 20 MG/100ML; MG/100ML; MG/100ML; MG/100ML
INJECTION, SOLUTION INTRAVENOUS PRN
Status: DISCONTINUED | OUTPATIENT
Start: 2023-11-25 | End: 2023-11-26 | Stop reason: HOSPADM

## 2023-11-25 RX ORDER — OXYTOCIN 10 [USP'U]/ML
10 INJECTION, SOLUTION INTRAMUSCULAR; INTRAVENOUS
Status: DISCONTINUED | OUTPATIENT
Start: 2023-11-25 | End: 2023-11-25 | Stop reason: HOSPADM

## 2023-11-25 RX ORDER — IBUPROFEN 800 MG/1
800 TABLET ORAL
Status: DISCONTINUED | OUTPATIENT
Start: 2023-11-25 | End: 2023-11-25 | Stop reason: HOSPADM

## 2023-11-25 RX ORDER — IBUPROFEN 800 MG/1
800 TABLET ORAL EVERY 8 HOURS PRN
Status: DISCONTINUED | OUTPATIENT
Start: 2023-11-25 | End: 2023-11-26 | Stop reason: HOSPADM

## 2023-11-25 RX ORDER — ROPIVACAINE HYDROCHLORIDE 2 MG/ML
INJECTION, SOLUTION EPIDURAL; INFILTRATION; PERINEURAL
Status: COMPLETED
Start: 2023-11-25 | End: 2023-11-25

## 2023-11-25 RX ORDER — OXYCODONE HYDROCHLORIDE 5 MG/1
5 TABLET ORAL EVERY 4 HOURS PRN
Status: DISCONTINUED | OUTPATIENT
Start: 2023-11-25 | End: 2023-11-26 | Stop reason: HOSPADM

## 2023-11-25 RX ORDER — BISACODYL 10 MG
10 SUPPOSITORY, RECTAL RECTAL PRN
Status: DISCONTINUED | OUTPATIENT
Start: 2023-11-25 | End: 2023-11-26 | Stop reason: HOSPADM

## 2023-11-25 RX ORDER — SODIUM CHLORIDE, SODIUM LACTATE, POTASSIUM CHLORIDE, CALCIUM CHLORIDE 600; 310; 30; 20 MG/100ML; MG/100ML; MG/100ML; MG/100ML
INJECTION, SOLUTION INTRAVENOUS CONTINUOUS
Status: DISCONTINUED | OUTPATIENT
Start: 2023-11-25 | End: 2023-11-25 | Stop reason: HOSPADM

## 2023-11-25 RX ORDER — DOCUSATE SODIUM 100 MG/1
100 CAPSULE, LIQUID FILLED ORAL 2 TIMES DAILY PRN
Status: DISCONTINUED | OUTPATIENT
Start: 2023-11-25 | End: 2023-11-26 | Stop reason: HOSPADM

## 2023-11-25 RX ORDER — CALCIUM CARBONATE 500 MG/1
1000 TABLET, CHEWABLE ORAL EVERY 6 HOURS PRN
Status: DISCONTINUED | OUTPATIENT
Start: 2023-11-25 | End: 2023-11-26 | Stop reason: HOSPADM

## 2023-11-25 RX ORDER — METHYLERGONOVINE MALEATE 0.2 MG/ML
0.2 INJECTION INTRAVENOUS
Status: DISCONTINUED | OUTPATIENT
Start: 2023-11-25 | End: 2023-11-26 | Stop reason: HOSPADM

## 2023-11-25 RX ORDER — ASPIRIN 81 MG/1
81 TABLET ORAL DAILY
Status: ON HOLD | COMMUNITY
End: 2023-11-26

## 2023-11-25 RX ORDER — SODIUM CHLORIDE, SODIUM LACTATE, POTASSIUM CHLORIDE, AND CALCIUM CHLORIDE .6; .31; .03; .02 G/100ML; G/100ML; G/100ML; G/100ML
250 INJECTION, SOLUTION INTRAVENOUS PRN
Status: CANCELLED | OUTPATIENT
Start: 2023-11-25

## 2023-11-25 RX ADMIN — OXYTOCIN 2 MILLI-UNITS/MIN: 10 INJECTION, SOLUTION INTRAMUSCULAR; INTRAVENOUS at 11:00

## 2023-11-25 RX ADMIN — OXYTOCIN 20 UNITS: 10 INJECTION, SOLUTION INTRAMUSCULAR; INTRAVENOUS at 13:39

## 2023-11-25 RX ADMIN — SODIUM CHLORIDE, POTASSIUM CHLORIDE, SODIUM LACTATE AND CALCIUM CHLORIDE: 600; 310; 30; 20 INJECTION, SOLUTION INTRAVENOUS at 10:59

## 2023-11-25 RX ADMIN — ROPIVACAINE HYDROCHLORIDE 200 MG: 2 INJECTION, SOLUTION EPIDURAL; INFILTRATION at 11:38

## 2023-11-25 RX ADMIN — OXYTOCIN 125 ML/HR: 10 INJECTION, SOLUTION INTRAMUSCULAR; INTRAVENOUS at 14:51

## 2023-11-25 RX ADMIN — OXYTOCIN 125 ML/HR: 10 INJECTION, SOLUTION INTRAMUSCULAR; INTRAVENOUS at 13:41

## 2023-11-25 RX ADMIN — MISOPROSTOL 25 MCG: 100 TABLET ORAL at 06:30

## 2023-11-25 ASSESSMENT — PATIENT HEALTH QUESTIONNAIRE - PHQ9
2. FEELING DOWN, DEPRESSED, IRRITABLE, OR HOPELESS: NOT AT ALL
1. LITTLE INTEREST OR PLEASURE IN DOING THINGS: NOT AT ALL
SUM OF ALL RESPONSES TO PHQ9 QUESTIONS 1 AND 2: 0

## 2023-11-25 ASSESSMENT — PAIN SCALES - GENERAL: PAIN_LEVEL: 2

## 2023-11-25 ASSESSMENT — FIBROSIS 4 INDEX: FIB4 SCORE: 0.2

## 2023-11-25 ASSESSMENT — LIFESTYLE VARIABLES
EVER_SMOKED: NEVER
ALCOHOL_USE: NO

## 2023-11-25 NOTE — CARE PLAN
The patient is Stable - Low risk of patient condition declining or worsening    Shift Goals  Clinical Goals: cervical change  Patient Goals: delivery of infant  Family Goals: healthy mom and baby    Problem: Risk for Infection and Impaired Wound Healing  Goal: Patient will remain free from infection  Outcome: Progressing  Note: Hand hygiene before and after pt care     Problem: Pain  Goal: Patient's pain will be alleviated or reduced to the patient’s comfort goal  Outcome: Progressing  Flowsheets (Taken 11/25/2023 1024)  OB Pain Level: 0-No Pain  Note: Pt unsure of plan for pain management.  She has been informed and educated on choices and will inform RN when she needs or wants interventions

## 2023-11-25 NOTE — PROGRESS NOTES
Late entry    0512-TOCO and US on.  VSS.  Pt presents to L&D for her scheduled elective IOL.  SVE=1-2/thick/high.  Dr. Fajardo called and updated on exam-orders for cytotec placement.  0630-Cytotec placed.  1040-SVE=no change, pt coco frequently with discomfort. Dr Fajardo called and updated-orders received to start Pitocon.  1100-Pitocon started per pump.  Pt states she would like an epidural-->IV hydration started.  1130-Dr. Pablo at  to place epidural.  1222-Pitocin turned off due to frequency of UCs and pt's discomfort.  1223-SVE=9/100/-1.  Dr. Fajardo called and requested to come for delivery.  1249-Dr. ZAMORA, per Dr. Fajardo, light meconium.  1255-Delivery of viable male, 8/9 apgars.  1258-Placenta delivered, oxytocin running. 1430-Pt up to BR, voided, pericare, gown changed-ready to transfer to PP unit

## 2023-11-25 NOTE — ANESTHESIA PROCEDURE NOTES
Epidural Block    Date/Time: 11/25/2023 11:26 AM    Performed by: Federico Pablo M.D.  Authorized by: Federico Pablo M.D.    Patient Location:  OB  Start Time:  11/25/2023 11:26 AM  Reason for Block: labor analgesia    patient identified, IV checked, site marked, risks and benefits discussed, surgical consent, monitors and equipment checked and pre-op evaluation    Patient Position:  Sitting  Prep: ChloraPrep, patient draped and sterile technique    Monitoring:  Blood pressure, continuous pulse oximetry and heart rate  Approach:  Midline  Location:  L3-L4  Injection Technique:  KAMRON air  Skin infiltration:  Lidocaine  Strength:  1%  Dose:  3ml  Needle Type:  Tuohy  Needle Gauge:  17 G  Needle Length:  3.5 in  Loss of resistance::  11  Catheter Size:  19 G  Catheter at Skin Depth:  11

## 2023-11-25 NOTE — H&P
History and Physical      Jaz Pinon is a 23 y.o. female  at 39w0d who presents for elective IOL secondary to maternal intolerance to labor. Pt has been having irregular CTXs but no LOF or VB. Good FM    ROS: A comprehensive review of systems was negative.    Past Medical History:   Diagnosis Date    Anxiety     Depression     stopped taking medication 2 years ago. she has been admitted to [facility] 2 times    Encounter for postpartum care of lactating mother 2018    PIH (pregnancy induced hypertension), third trimester 2018     Past Surgical History:   Procedure Laterality Date    DENTAL SURGERY      wisdom teeth     Family History   Problem Relation Age of Onset    No Known Problems Mother     No Known Problems Father     Hyperlipidemia Maternal Grandmother     Asthma Brother      OB History    Para Term  AB Living   4 3 3     3   SAB IAB Ectopic Molar Multiple Live Births             3      # Outcome Date GA Lbr Lopez/2nd Weight Sex Delivery Anes PTL Lv   4 Current            3 Term 18 37w5d  3.115 kg (6 lb 13.9 oz) F Vag-Spont   PEPE   2 Term         PEPE   1 Term         PEPE     Social History     Tobacco Use    Smoking status: Never    Smokeless tobacco: Never   Vaping Use    Vaping Use: Never used   Substance Use Topics    Alcohol use: Not Currently    Drug use: Not Currently     Types: Inhaled     Comment: THC     Allergies: Patient has no known allergies.    Prenatal care with Fajardo starting at 1st trimester with following problems:  H/O preeclampsia    Objective:      /80   Pulse (!) 101   Temp 36.4 °C (97.6 °F) (Temporal)   Wt 93.4 kg (206 lb)     General:   no acute distress, alert, cooperative, mildly obese   Skin:   normal   HEENT:  EOMI and Neck supple with midline trachea   Lungs:   CTA bilateral   Heart:   regular rate and rhythm   Abdomen:   gravid, NT   EFW:  8 lbs 8 oz   Pelvis:  adequate with gynecoid pelvis   FHT:  Reactive NST   Uterine  Size: S=D   Presentations: Cephalic   Cervix:     Dilation: 2cm    Effacement: 50%    Station:  -3    Consistency: Soft    Position: Anterior     Lab Review  Recent Results (from the past 5880 hour(s))   CBC WITH DIFFERENTIAL    Collection Time: 05/05/23  1:35 PM   Result Value Ref Range    WBC 10.6 4.8 - 10.8 K/uL    RBC 4.85 4.20 - 5.40 M/uL    Hemoglobin 13.7 12.0 - 16.0 g/dL    Hematocrit 42.1 37.0 - 47.0 %    MCV 86.8 81.4 - 97.8 fL    MCH 28.2 27.0 - 33.0 pg    MCHC 32.5 (L) 33.6 - 35.0 g/dL    RDW 49.0 35.9 - 50.0 fL    Platelet Count 372 164 - 446 K/uL    MPV 10.0 9.0 - 12.9 fL    Neutrophils-Polys 71.90 44.00 - 72.00 %    Lymphocytes 21.70 (L) 22.00 - 41.00 %    Monocytes 4.80 0.00 - 13.40 %    Eosinophils 0.80 0.00 - 6.90 %    Basophils 0.30 0.00 - 1.80 %    Immature Granulocytes 0.50 0.00 - 0.90 %    Nucleated RBC 0.00 /100 WBC    Neutrophils (Absolute) 7.61 (H) 2.00 - 7.15 K/uL    Lymphs (Absolute) 2.30 1.00 - 4.80 K/uL    Monos (Absolute) 0.51 0.00 - 0.85 K/uL    Eos (Absolute) 0.09 0.00 - 0.51 K/uL    Baso (Absolute) 0.03 0.00 - 0.12 K/uL    Immature Granulocytes (abs) 0.05 0.00 - 0.11 K/uL    NRBC (Absolute) 0.00 K/uL   COMP METABOLIC PANEL    Collection Time: 05/05/23  1:35 PM   Result Value Ref Range    Sodium 136 135 - 145 mmol/L    Potassium 3.7 3.6 - 5.5 mmol/L    Chloride 102 96 - 112 mmol/L    Co2 21 20 - 33 mmol/L    Anion Gap 13.0 7.0 - 16.0    Glucose 109 (H) 65 - 99 mg/dL    Bun 7 (L) 8 - 22 mg/dL    Creatinine 0.48 (L) 0.50 - 1.40 mg/dL    Calcium 9.2 8.5 - 10.5 mg/dL    AST(SGOT) 43 12 - 45 U/L    ALT(SGPT) 86 (H) 2 - 50 U/L    Alkaline Phosphatase 73 30 - 99 U/L    Total Bilirubin 0.3 0.1 - 1.5 mg/dL    Albumin 4.0 3.2 - 4.9 g/dL    Total Protein 8.1 6.0 - 8.2 g/dL    Globulin 4.1 (H) 1.9 - 3.5 g/dL    A-G Ratio 1.0 g/dL   LIPASE    Collection Time: 05/05/23  1:35 PM   Result Value Ref Range    Lipase 71 11 - 82 U/L   HCG QUANTITATIVE    Collection Time: 05/05/23  1:35 PM   Result Value  Ref Range    [facility] [place] (H) 0.0 - 5.0 mIU/mL   URINALYSIS,CULTURE IF INDICATED    Collection Time: 05/05/23  1:35 PM    Specimen: Urine   Result Value Ref Range    Color Yellow     Character Turbid (A)     Specific Gravity 1.020 <1.035    Ph 7.5 5.0 - 8.0    Glucose Negative Negative mg/dL    Ketones Negative Negative mg/dL    Protein Negative Negative mg/dL    Bilirubin Negative Negative    Urobilinogen, Urine 1.0 Negative    Nitrite Negative Negative    Leukocyte Esterase Negative Negative    Occult Blood Negative Negative    Micro Urine Req Microscopic    RH TYPE FOR RHOGAM FROM E.D.    Collection Time: 05/05/23  1:35 PM   Result Value Ref Range    [facility] Rh Typing POS     Number Of Rh Doses Indicated ZERO    CORRECTED CALCIUM    Collection Time: 05/05/23  1:35 PM   Result Value Ref Range    Correct Calcium 9.2 8.5 - 10.5 mg/dL   ESTIMATED GFR    Collection Time: 05/05/23  1:35 PM   Result Value Ref Range    GFR (CKD-EPI) 135 >60 mL/min/1.73 m 2   URINE MICROSCOPIC (W/UA)    Collection Time: 05/05/23  1:35 PM   Result Value Ref Range    WBC 0-2 /hpf    RBC 0-2 /hpf    Bacteria Few (A) None /hpf    Epithelial Cells Few /hpf    Amorphous Crystal Present /hpf    Hyaline Cast 0-2 /lpf   CBC WITH DIFFERENTIAL    Collection Time: 05/25/23  2:13 PM   Result Value Ref Range    WBC 9.9 4.8 - 10.8 K/uL    RBC 4.56 4.20 - 5.40 M/uL    Hemoglobin 13.1 12.0 - 16.0 g/dL    Hematocrit 38.5 37.0 - 47.0 %    MCV 84.4 81.4 - 97.8 fL    MCH 28.7 27.0 - 33.0 pg    MCHC 34.0 32.2 - 35.5 g/dL    RDW 44.9 35.9 - 50.0 fL    Platelet Count 304 164 - 446 K/uL    MPV 10.1 9.0 - 12.9 fL    Neutrophils-Polys 65.30 44.00 - 72.00 %    Lymphocytes 27.50 22.00 - 41.00 %    Monocytes 4.20 0.00 - 13.40 %    Eosinophils 2.40 0.00 - 6.90 %    Basophils 0.30 0.00 - 1.80 %    Immature Granulocytes 0.30 0.00 - 0.90 %    Nucleated RBC 0.00 0.00 - 0.20 /100 WBC    Neutrophils (Absolute) 6.48 1.82 - 7.42 K/uL    Lymphs (Absolute) 2.73  1.00 - 4.80 K/uL    Monos (Absolute) 0.42 0.00 - 0.85 K/uL    Eos (Absolute) 0.24 0.00 - 0.51 K/uL    Baso (Absolute) 0.03 0.00 - 0.12 K/uL    Immature Granulocytes (abs) 0.03 0.00 - 0.11 K/uL    NRBC (Absolute) 0.00 K/uL   COMP METABOLIC PANEL    Collection Time: 05/25/23  2:13 PM   Result Value Ref Range    Sodium 137 135 - 145 mmol/L    Potassium 3.5 (L) 3.6 - 5.5 mmol/L    Chloride 104 96 - 112 mmol/L    Co2 20 20 - 33 mmol/L    Anion Gap 13.0 7.0 - 16.0    Glucose 110 (H) 65 - 99 mg/dL    Bun 8 8 - 22 mg/dL    Creatinine 0.41 (L) 0.50 - 1.40 mg/dL    Calcium 9.0 8.5 - 10.5 mg/dL    AST(SGOT) 10 (L) 12 - 45 U/L    ALT(SGPT) 16 2 - 50 U/L    Alkaline Phosphatase 52 30 - 99 U/L    Total Bilirubin 0.2 0.1 - 1.5 mg/dL    Albumin 3.8 3.2 - 4.9 g/dL    Total Protein 7.3 6.0 - 8.2 g/dL    Globulin 3.5 1.9 - 3.5 g/dL    A-G Ratio 1.1 g/dL   LIPASE    Collection Time: 05/25/23  2:13 PM   Result Value Ref Range    Lipase 76 11 - 82 U/L   HCG QUANTITATIVE    Collection Time: 05/25/23  2:13 PM   Result Value Ref Range    Bhcg 31146.0 (H) 0.0 - 5.0 mIU/mL   ESTIMATED GFR    Collection Time: 05/25/23  2:13 PM   Result Value Ref Range    GFR (CKD-EPI) 140 >60 mL/min/1.73 m 2   CORRECTED CALCIUM    Collection Time: 05/25/23  2:13 PM   Result Value Ref Range    Correct Calcium 9.2 8.5 - 10.5 mg/dL   URINALYSIS,CULTURE IF INDICATED    Collection Time: 05/25/23  3:48 PM    Specimen: Urine   Result Value Ref Range    Color Yellow     Character Cloudy (A)     Specific Gravity 1.018 <1.035    Ph 7.0 5.0 - 8.0    Glucose Negative Negative mg/dL    Ketones Negative Negative mg/dL    Protein Negative Negative mg/dL    Bilirubin Negative Negative    Urobilinogen, Urine 0.2 Negative    Nitrite Negative Negative    Leukocyte Esterase Negative Negative    Occult Blood Negative Negative    Micro Urine Req Microscopic    URINE MICROSCOPIC (W/UA)    Collection Time: 05/25/23  3:48 PM   Result Value Ref Range    WBC 0-2 /hpf    RBC 0-2 /hpf     Bacteria Few (A) None /hpf    Epithelial Cells Moderate (A) /hpf    Hyaline Cast 0-2 /lpf   AMNISURE ROM ASSAY    Collection Time: 11/16/23  3:11 PM   Result Value Ref Range    AmniSure ROM Negative Negative   Hold Blood Bank Specimen (Not Tested)    Collection Time: 11/25/23  5:25 AM   Result Value Ref Range    Holding Tube - Bb DONE    CBC with differential    Collection Time: 11/25/23  5:25 AM   Result Value Ref Range    WBC 10.5 4.8 - 10.8 K/uL    RBC 4.39 4.20 - 5.40 M/uL    Hemoglobin 11.2 (L) 12.0 - 16.0 g/dL    Hematocrit 35.8 (L) 37.0 - 47.0 %    MCV 81.5 81.4 - 97.8 fL    MCH 25.5 (L) 27.0 - 33.0 pg    MCHC 31.3 (L) 32.2 - 35.5 g/dL    RDW 54.4 (H) 35.9 - 50.0 fL    Platelet Count 279 164 - 446 K/uL    MPV 11.3 9.0 - 12.9 fL    Neutrophils-Polys 69.40 44.00 - 72.00 %    Lymphocytes 23.00 22.00 - 41.00 %    Monocytes 5.40 0.00 - 13.40 %    Eosinophils 1.20 0.00 - 6.90 %    Basophils 0.30 0.00 - 1.80 %    Immature Granulocytes 0.70 0.00 - 0.90 %    Nucleated RBC 0.30 (H) 0.00 - 0.20 /100 WBC    Neutrophils (Absolute) 7.26 1.82 - 7.42 K/uL    Lymphs (Absolute) 2.41 1.00 - 4.80 K/uL    Monos (Absolute) 0.57 0.00 - 0.85 K/uL    Eos (Absolute) 0.13 0.00 - 0.51 K/uL    Baso (Absolute) 0.03 0.00 - 0.12 K/uL    Immature Granulocytes (abs) 0.07 0.00 - 0.11 K/uL    NRBC (Absolute) 0.03 K/uL   T.PALLIDUM AB AGUILAR (Syphilis)    Collection Time: 11/25/23  9:33 AM   Result Value Ref Range    Syphilis, Treponemal Qual Non-Reactive Non-Reactive        Assessment:   Jaz Mayson Montesinos Pinon at 39w0d  Labor status: IOL  Plan:   Admit to L&D  GBS negative  Pt has been given cytotec x 1 and now will start pitocin IOL  Epidural if desired

## 2023-11-25 NOTE — ANESTHESIA PREPROCEDURE EVALUATION
Date: 11/25/23  Procedure: Labor Epidural         Relevant Problems   No relevant active problems       Physical Exam    Airway   Mallampati: II  TM distance: >3 FB  Neck ROM: full       Cardiovascular - normal exam  Rhythm: regular  Rate: normal  (-) murmur     Dental - normal exam           Pulmonary - normal exam  Breath sounds clear to auscultation     Abdominal    Neurological - normal exam                   Anesthesia Plan    ASA 2       Plan - epidural   Neuraxial block will be labor analgesia                  Pertinent diagnostic labs and testing reviewed    Informed Consent:    Anesthetic plan and risks discussed with patient.

## 2023-11-25 NOTE — PROGRESS NOTES
1450 Assume care from L&D RN. Oriented patient to room,call light, and emergancy light. Assessment completed,fundus firm,lochia light. Plan of care reviewed, verbilized understanding. Patient denies pain at this time, will call if intervention needed.

## 2023-11-26 VITALS
BODY MASS INDEX: 35.36 KG/M2 | RESPIRATION RATE: 17 BRPM | HEART RATE: 86 BPM | OXYGEN SATURATION: 96 % | WEIGHT: 206 LBS | DIASTOLIC BLOOD PRESSURE: 61 MMHG | SYSTOLIC BLOOD PRESSURE: 104 MMHG | TEMPERATURE: 97.3 F

## 2023-11-26 LAB
ERYTHROCYTE [DISTWIDTH] IN BLOOD BY AUTOMATED COUNT: 52.9 FL (ref 35.9–50)
HCT VFR BLD AUTO: 33 % (ref 37–47)
HGB BLD-MCNC: 10.4 G/DL (ref 12–16)
MCH RBC QN AUTO: 25.1 PG (ref 27–33)
MCHC RBC AUTO-ENTMCNC: 31.5 G/DL (ref 32.2–35.5)
MCV RBC AUTO: 79.7 FL (ref 81.4–97.8)
PLATELET # BLD AUTO: 254 K/UL (ref 164–446)
PMV BLD AUTO: 11.1 FL (ref 9–12.9)
RBC # BLD AUTO: 4.14 M/UL (ref 4.2–5.4)
WBC # BLD AUTO: 14 K/UL (ref 4.8–10.8)

## 2023-11-26 PROCEDURE — 700102 HCHG RX REV CODE 250 W/ 637 OVERRIDE(OP): Performed by: OBSTETRICS & GYNECOLOGY

## 2023-11-26 PROCEDURE — A9270 NON-COVERED ITEM OR SERVICE: HCPCS | Performed by: OBSTETRICS & GYNECOLOGY

## 2023-11-26 PROCEDURE — 85027 COMPLETE CBC AUTOMATED: CPT

## 2023-11-26 PROCEDURE — 36415 COLL VENOUS BLD VENIPUNCTURE: CPT

## 2023-11-26 RX ADMIN — PRENATAL WITH FERROUS FUM AND FOLIC ACID 1 TABLET: 3080; 920; 120; 400; 22; 1.84; 3; 20; 10; 1; 12; 200; 27; 25; 2 TABLET ORAL at 08:22

## 2023-11-26 RX ADMIN — IBUPROFEN 800 MG: 800 TABLET, FILM COATED ORAL at 08:29

## 2023-11-26 ASSESSMENT — EDINBURGH POSTNATAL DEPRESSION SCALE (EPDS)
THE THOUGHT OF HARMING MYSELF HAS OCCURRED TO ME: NEVER
THINGS HAVE BEEN GETTING ON TOP OF ME: NO, I HAVE BEEN COPING AS WELL AS EVER
I HAVE BEEN SO UNHAPPY THAT I HAVE BEEN CRYING: NO, NEVER
I HAVE FELT SCARED OR PANICKY FOR NO GOOD REASON: NO, NOT AT ALL
I HAVE BEEN SO UNHAPPY THAT I HAVE HAD DIFFICULTY SLEEPING: NOT AT ALL
I HAVE BEEN ABLE TO LAUGH AND SEE THE FUNNY SIDE OF THINGS: AS MUCH AS I ALWAYS COULD
I HAVE LOOKED FORWARD WITH ENJOYMENT TO THINGS: AS MUCH AS I EVER DID
I HAVE BEEN ANXIOUS OR WORRIED FOR NO GOOD REASON: YES, SOMETIMES
I HAVE BLAMED MYSELF UNNECESSARILY WHEN THINGS WENT WRONG: YES, SOME OF THE TIME
I HAVE FELT SAD OR MISERABLE: NO, NOT AT ALL

## 2023-11-26 ASSESSMENT — PAIN DESCRIPTION - PAIN TYPE
TYPE: ACUTE PAIN
TYPE: ACUTE PAIN

## 2023-11-26 NOTE — ANESTHESIA TIME REPORT
Anesthesia Start and Stop Event Times       Date Time Event    11/25/2023 1126 Ready for Procedure     1126 Anesthesia Start     1255 Anesthesia Stop          Responsible Staff  11/25/23      Name Role Begin End    Federico Pablo M.D. Anesth 1126 1255          Overtime Reason:  no overtime (within assigned shift)    Comments:

## 2023-11-26 NOTE — PROGRESS NOTES
1945 Pt doing well bonding with baby, Assessment done fundus firm with light lochia, abdomen soft non distended, denies pain at this time, Plan of care discussed, Needs attended.

## 2023-11-26 NOTE — LACTATION NOTE
This note was copied from a baby's chart.  Initial  visit, mother reports she is breast and formula feeding in combination per her desired feeding plan, she combination fed her other children as well and feels content with her feeding decision. Offered breastfeeding assistance and mother declines. Reports baby drinks well from bottle and mother verbalizes understanding of supplemental feeding volume guidelines and paced bottle feeding. Interested in re-enrolling in [facility] program, referral faxed. Provided list of outpatient community lactation resources. Plan to ensure baby feeds well at least every 3 hours or sooner for hunger cues, combination of breast and/or bottle as desired. Parents deny questions/concerns.

## 2023-11-26 NOTE — CARE PLAN
The patient is Stable - Low risk of patient condition declining or worsening    Shift Goals  Clinical Goals: Patient will maintain stable VS; Lochia WDL; Pain WDL  Patient Goals: delivery of infant  Family Goals: healthy mom and baby    Progress made toward(s) clinical / shift goals:    Problem: Knowledge Deficit - Standard  Goal: Patient and family/care givers will demonstrate understanding of plan of care, disease process/condition, diagnostic tests and medications  Outcome: Progressing     Problem: Knowledge Deficit - Postpartum  Goal: Patient will verbalize and demonstrate understanding of self and infant care  Outcome: Progressing     Problem: Psychosocial - Postpartum  Goal: Patient will verbalize and demonstrate effective bonding and parenting behavior  Outcome: Progressing     Problem: Altered Physiologic Condition  Goal: Patient physiologically stable as evidenced by normal lochia, palpable uterine involution and vitals within normal limits  Outcome: Progressing     Problem: Infection - Postpartum  Goal: Postpartum patient will be free of signs and symptoms of infection  Outcome: Progressing

## 2023-11-26 NOTE — ANESTHESIA POSTPROCEDURE EVALUATION
Patient: aJz Pinon    Procedure Summary       Date: 11/25/23 Room / Location:     Anesthesia Start: 1126 Anesthesia Stop: 1255    Procedure: Labor Epidural Diagnosis:     Scheduled Providers:  Responsible Provider: Federico Pablo M.D.    Anesthesia Type: epidural ASA Status: 2            Final Anesthesia Type: epidural  Last vitals  BP   Blood Pressure: 127/81    Temp   36.8 °C (98.2 °F)    Pulse   100   Resp   20    SpO2   96 %      Anesthesia Post Evaluation    Patient location during evaluation: PACU  Patient participation: complete - patient participated  Level of consciousness: awake and alert  Pain score: 2    Airway patency: patent  Anesthetic complications: no  Cardiovascular status: hemodynamically stable  Respiratory status: acceptable  Hydration status: euvolemic    PONV: none          No notable events documented.     Nurse Pain Score: 0 (NPRS)

## 2023-11-26 NOTE — L&D DELIVERY NOTE
DATE OF SERVICE:  11/25/2023     DELIVERY NOTE:  The patient is a 23-year-old G4, P3-0-0-3 at 39 weeks 39   weeks' gestation who was sent in for induction of labor secondary to maternal   intolerance to pregnancy.  The patient, upon arrival, was noted to be 1-2   thick and -4 station.  The patient was given Cytotec x1, which did cause   contractions to happen and made maybe some slight change in effacement.  The   patient was then started on Pitocin induction of labor and very quickly went   to complete dilation after receiving an epidural for pain management.  The   patient was still intact at complete dilation and had artificial rupture of   membranes with some light meconium stained amniotic fluid noted.  The patient   pushed and delivered a viable infant over intact perineum.  There was nuchal   cord x1 as well as a body cord x1.  Bulb suction was performed on the   perineum.  The remainder of infant delivered without complication.  Placenta   delivered spontaneously, three-vessel cord noted to be intact.  EBL is 150 mL.   Anesthesia is epidural.  Sex of the infant is male.  Apgars are 8 and 9,   weight is 8 pounds 11 ounces.  Currently, mother and infant are both doing   well.        ______________________________  MD LALA Guzmán/LUCAS    DD:  11/25/2023 13:49  DT:  11/25/2023 16:45    Job#:  [id number]

## 2023-11-26 NOTE — DISCHARGE PLANNING
Discharge Planning Assessment Post Partum    Reason for Referral: h/o THC use  Address: [address] [place] [address]  Type of Living Situation:Stable  Mom Diagnosis:   Baby Diagnosis: 39 wks  Primary Language: English    Name of Baby: Jeremy  Father of the Baby: Kennedy Schwab  Involved in baby’s care? yes  Contact Information: [phone]    Prenatal Care: Yes  Mom's PCP: None  PCP for new baby: Charla Garcia MD    Support System: , Pt's mother (lives with pt and family)  Coping/Bonding between mother & baby: Appropriate  Source of Feeding: Bottle Feeding  Supplies for Infant: Has essentials per pt.    Mom's Insurance: [employer] Medicaid  Baby Covered on Insurance: Yes  Mother Employed/School: Unemployed @ this time; Kennedy works construction with his father in law.  Other children in the home/names & ages: 5yr, 3yr, 1yr.    Financial Hardship/Income: Denies   Mom's Mental status: Alert and Oriented  Services used prior to admit: Denies    CPS History: Denies  Psychiatric History: h/o Postpartum depression with 1st baby, denies treatment/medication  Domestic Violence History: Denies  Drug/ETOH History: h/o THC prior to pregnancy, says they do not smoke it around the children, they go outside or out to the garage aware from the children. Everything is stored out of reach of the kids.    Resources Provided: Outpatient Mental Health/Counseling Resources, Postpartum Resources, Community Resources, WIC, Diaper Marcum  Referrals Made: [facility], pt denies use during pregnancy and makes sure not to use around other children.      Clearance for Discharge: Infant cleared to d/c home with parents when medically cleared.

## 2023-11-26 NOTE — CARE PLAN
The patient is Stable - Low risk of patient condition declining or worsening    Shift Goals  Clinical Goals: VSS, lochia WDL  Patient Goals: pain controlled, breastfeed, rest  Family Goals: healthy mom and baby    Progress made toward(s) clinical / shift goals:    Problem: Knowledge Deficit - Standard  Goal: Patient and family/care givers will demonstrate understanding of plan of care, disease process/condition, diagnostic tests and medications  Description: Target End Date:  1-3 days or as soon as patient condition allows    Document in Patient Education    1.  Patient and family/caregiver oriented to unit, equipment, visitation policy and means for communicating concern  2.  Complete/review Learning Assessment  3.  Assess knowledge level of disease process/condition, treatment plan, diagnostic tests and medications  4.  Explain disease process/condition, treatment plan, diagnostic tests and medications  Outcome: Met     Problem: Knowledge Deficit - Postpartum  Goal: Patient will verbalize and demonstrate understanding of self and infant care  Description: Target End Date:  1-3 days or as soon as patient condition allows    Document in Patient Education    1.  Assess patient and knowledge of self and infant care  2.  Educate patient verbally, by demonstration and written material  Outcome: Met     Problem: Psychosocial - Postpartum  Goal: Patient will verbalize and demonstrate effective bonding and parenting behavior  Description: Target End Date:  1 to 4 days    1.  Assess patient for anxiety or apprehension regarding parenting role  2.  Provide emotional support and encouragement to patient/family/caregiver  Outcome: Met     Problem: Altered Physiologic Condition  Goal: Patient physiologically stable as evidenced by normal lochia, palpable uterine involution and vitals within normal limits  Description: Target End Date:  1 to 4 days    Document on Assessment flowsheet    1.  Perform physical assessment and obtain  vitals per intrapartum/postpartum standards of care  2.  Follow epidural/spinal narcotic protocol if patient has received a long acting narcotic  3.  Massage fundus as necessary to prevent excessive lochia  4.  Administer pitocin, methergine, cytotec or hemabate as ordered  Outcome: Met     Problem: Infection - Postpartum  Goal: Postpartum patient will be free of signs and symptoms of infection  Description: Target End Date:  Target End Date:  1 to 4 days  1.  Instruct patient in pericare/incisional care  2.  Give antibiotics as indicated an ordered  3.  Get patient out of bed and ambulating as ordered  Outcome: Met       Patient is not progressing towards the following goals:

## 2023-11-26 NOTE — PROGRESS NOTES
0700- Received report from night shift RN. Assumed care. 12 hour chart check, MAR and orders reviewed.      0800- Assessment complete. Fundus firm and palpable, lochia scant rubra. Pain management and interventions discussed with pt. SO at bedside. POC discussed. All questions and concerns discussed. No further concerns.

## 2023-11-26 NOTE — DISCHARGE SUMMARY
Discharge Summary:      Jaz Pinon    Admit Date:   2023  Discharge Date:  2023     Admitting diagnosis:  Indication for care in labor or delivery [O75.9]  Discharge Diagnosis: Status post vaginal, spontaneous.  Pregnancy Complications: none  Tubal Ligation:  no        History:  Past Medical History:   Diagnosis Date    Anxiety     Depression     stopped taking medication 2 years ago. she has been admitted to [facility] 2 times    Encounter for postpartum care of lactating mother 2018    PIH (pregnancy induced hypertension), third trimester 2018     OB History    Para Term  AB Living   4 4 4     4   SAB IAB Ectopic Molar Multiple Live Births           0 4      # Outcome Date GA Lbr Lopez/2nd Weight Sex Delivery Anes PTL Lv   4 Term 23 39w0d / 00:06 3.94 kg (8 lb 11 oz) M Vag-Spont EPI  EPPE   3 Term 18 37w5d  3.115 kg (6 lb 13.9 oz) F Vag-Spont   PEPE   2 Term         PEPE   1 Term         PEPE        Patient has no known allergies.  Patient Active Problem List    Diagnosis Date Noted    Encounter for postpartum care of lactating mother 2018    History of depression 2017        Hospital Course:   23 y.o. , now para 4, was admitted with the above mentioned diagnosis, underwent Induction of Labor, vaginal, spontaneous. Patient postpartum course was unremarkable, with progressive advancement in diet , ambulation and toleration of oral analgesia. Patient without complaints today and desires discharge.      Vitals:    23 1802 23 1816 23 2220 23 0546   BP: 129/80 127/81 134/86 104/61   Pulse: 95 100 93 86   Resp: 16 20 18 17   Temp: 37.6 °C (99.6 °F) 36.8 °C (98.2 °F) 36.4 °C (97.6 °F) 36.3 °C (97.3 °F)   TempSrc: Temporal Temporal Temporal Temporal   SpO2: 95% 96% 100% 96%   Weight:           Exam:  Breast Exam: negative  Abdomen: Abdomen soft, non-tender. BS normal. No masses,  No organomegaly  Fundus Non Tender:  yes  Incision: none  Perineum: perineum intact  Extremity: extremities, peripheral pulses and reflexes normal     Labs:  Recent Labs     11/25/23  0525 11/26/23  0532   WBC 10.5 14.0*   RBC 4.39 4.14*   HEMOGLOBIN 11.2* 10.4*   HEMATOCRIT 35.8* 33.0*   MCV 81.5 79.7*   MCH 25.5* 25.1*   MCHC 31.3* 31.5*   RDW 54.4* 52.9*   PLATELETCT 279 254   MPV 11.3 11.1        Activity:   Discharge to home  Pelvic Rest x 6 weeks    Assessment:  normal postpartum course  Discharge Assessment: No heavy bleeding or foul vaginal discharge      Follow up: 6 weeks     Discharge Meds:   No current outpatient medications on file.       Deonna Fajardo M.D.

## 2023-11-26 NOTE — CARE PLAN
Problem: Knowledge Deficit - Postpartum  Goal: Patient will verbalize and demonstrate understanding of self and infant care  Outcome: Progressing     Problem: Altered Physiologic Condition  Goal: Patient physiologically stable as evidenced by normal lochia, palpable uterine involution and vitals within normal limits  Outcome: Progressing   The patient is Stable - Low risk of patient condition declining or worsening    Shift Goals: pain controlled, rest  Clinical Goals: maintain normal lochia  Patient Goals: pain controlled, breastfeed, rest  Family Goals: healthy mom and baby    Progress made toward(s) clinical / shift goals:  pt verbalized acceptable level of pain    Patient is not progressing towards the following goals:

## 2023-11-26 NOTE — ANESTHESIA POSTPROCEDURE EVALUATION
Patient: Jaz Pinon    Procedure Summary       Date: 11/25/23 Room / Location:     Anesthesia Start: 1126 Anesthesia Stop: 1255    Procedure: Labor Epidural Diagnosis:     Scheduled Providers:  Responsible Provider: Federico Pablo M.D.    Anesthesia Type: epidural ASA Status: 2            Final Anesthesia Type: epidural  Last vitals  BP   Blood Pressure: 127/81    Temp   36.8 °C (98.2 °F)    Pulse   100   Resp   20    SpO2   96 %      Anesthesia Post Evaluation    Patient location during evaluation: PACU  Patient participation: complete - patient participated  Level of consciousness: awake and alert    Airway patency: patent  Anesthetic complications: no  Cardiovascular status: hemodynamically stable  Respiratory status: acceptable  Hydration status: euvolemic    PONV: none          No notable events documented.     Nurse Pain Score: 0 (NPRS)

## 2023-11-27 NOTE — DISCHARGE INSTRUCTIONS

## 2023-11-27 NOTE — PROGRESS NOTES
Discharge paperwork for infant and mom discussed at bedside. All questions answered. Follow-up appointments reviewed. Parents aware of NBS #2 and dates to complete it by. Paperwork signed and dated at this time.    1722- Patient and infant discharged with escort off unit. Infant discharged in car seat, patient in wheelchair. Infant placed in car seat by parents and checked by RN. Cuddles removed. Bands verified. All questions answered at this time.

## 2024-05-02 ENCOUNTER — APPOINTMENT (OUTPATIENT)
Dept: RADIOLOGY | Facility: MEDICAL CENTER | Age: 24
End: 2024-05-02
Attending: STUDENT IN AN ORGANIZED HEALTH CARE EDUCATION/TRAINING PROGRAM
Payer: COMMERCIAL

## 2024-05-02 ENCOUNTER — HOSPITAL ENCOUNTER (EMERGENCY)
Facility: MEDICAL CENTER | Age: 24
End: 2024-05-02
Attending: STUDENT IN AN ORGANIZED HEALTH CARE EDUCATION/TRAINING PROGRAM
Payer: COMMERCIAL

## 2024-05-02 VITALS
HEIGHT: 64 IN | BODY MASS INDEX: 33.27 KG/M2 | HEART RATE: 98 BPM | WEIGHT: 194.89 LBS | SYSTOLIC BLOOD PRESSURE: 128 MMHG | OXYGEN SATURATION: 95 % | RESPIRATION RATE: 17 BRPM | TEMPERATURE: 97.3 F | DIASTOLIC BLOOD PRESSURE: 88 MMHG

## 2024-05-02 DIAGNOSIS — S61.412A LACERATION OF LEFT HAND WITHOUT FOREIGN BODY, INITIAL ENCOUNTER: ICD-10-CM

## 2024-05-02 DIAGNOSIS — R20.0 FINGER NUMBNESS: ICD-10-CM

## 2024-05-02 RX ORDER — LIDOCAINE HYDROCHLORIDE AND EPINEPHRINE 10; 10 MG/ML; UG/ML
10 INJECTION, SOLUTION INFILTRATION; PERINEURAL ONCE
Status: COMPLETED | OUTPATIENT
Start: 2024-05-02 | End: 2024-05-02

## 2024-05-02 RX ORDER — DOXYCYCLINE 100 MG/1
100 CAPSULE ORAL 2 TIMES DAILY
Qty: 10 CAPSULE | Refills: 0 | Status: ACTIVE | OUTPATIENT
Start: 2024-05-02 | End: 2024-05-07

## 2024-05-02 RX ADMIN — LIDOCAINE HYDROCHLORIDE AND EPINEPHRINE 10 ML: 10; 10 INJECTION, SOLUTION INFILTRATION; PERINEURAL at 21:45

## 2024-05-02 ASSESSMENT — FIBROSIS 4 INDEX: FIB4 SCORE: 0.25

## 2024-05-03 NOTE — ED PROVIDER NOTES
ED Provider Note    CHIEF COMPLAINT  Chief Complaint   Patient presents with    Laceration     Pt was cutting oysters, and the knife slipped and cut the palm of the left hand. Movement and circulation intact, however pt reports numbness to middle finger. Wound is bandaged, bleeding has stopped.         HPI/ROS  LIMITATION TO HISTORY   Select: : None  OUTSIDE HISTORIAN(S):    Jaz Pinon is a 24 y.o. female who presents with a laceration to the left palm.  Patient reportedly was checking oysters when her knife slipped and cut the palm of her left hand.  Patient endorses numbness to the index finger.  Patient reports pain that radiates up the left upper extremity.    PAST MEDICAL HISTORY   has a past medical history of Anxiety, Depression, Encounter for postpartum care of lactating mother (03/20/2018), and PIH (pregnancy induced hypertension), third trimester (02/01/2018).    SURGICAL HISTORY   has a past surgical history that includes dental surgery.    FAMILY HISTORY  Family History   Problem Relation Age of Onset    No Known Problems Mother     No Known Problems Father     Hyperlipidemia Maternal Grandmother     Asthma Brother        SOCIAL HISTORY  Social History     Tobacco Use    Smoking status: Never    Smokeless tobacco: Never   Vaping Use    Vaping Use: Never used   Substance and Sexual Activity    Alcohol use: Not Currently    Drug use: Not Currently     Types: Inhaled     Comment: THC    Sexual activity: Yes     Partners: Male     Birth control/protection: Pill     Comment: Unplanned pregnancy       CURRENT MEDICATIONS  Home Medications       Reviewed by Gold Brantley R.N. (Registered Nurse) on 05/02/24 at 2025  Med List Status: Not Addressed     Medication Last Dose Status   ferrous sulfate 325 (65 Fe) MG tablet  Active   Prenatal MV-Min-Fe Fum-FA-DHA (PRENATAL 1 PO)  Active                    ALLERGIES  No Known Allergies    PHYSICAL EXAM  VITAL SIGNS: BP (!) 132/92   Pulse (!) 102   Temp  "36.9 °C (98.4 °F) (Temporal)   Resp 16   Ht 1.626 m (5' 4.02\")   Wt 88.4 kg (194 lb 14.2 oz)   LMP 04/30/2024 (Approximate)   SpO2 97%   BMI 33.44 kg/m²    Vitals and nursing note reviewed.   Constitutional:       Comments: Patient is lying in bed supine, pleasant, conversant, speaking in complete sentences   HENT:      Head: Normocephalic and atraumatic.   Eyes:      Extraocular Movements: Extraocular movements intact.      Conjunctiva/sclera: Conjunctivae normal.      Pupils: Pupils are equal, round, and reactive to light.   Cardiovascular:      Pulses: Normal pulses.      Comments:   Pulmonary:      Effort: Pulmonary effort is normal. No respiratory distress.   Musculoskeletal:      1 cm laceration to the mid palm of the left hand. Sensation intact at first dorsal web space, index finger volar tip and little finger volar tip.  Decreased sensation at the middle finger palmar aspect.  Wrist extension, thumb extension, thumb abduction, thumb opposition, and finger abduction/adduction motor functions intact. 2+ radial and ulnar pulses. Normal capillary refill at fingertips.     Cervical back: Normal range of motion. No rigidity.   Skin:     General: Skin is warm and dry.      Capillary Refill: Capillary refill takes less than 2 seconds.   Neurological:      Mental Status: Alert.       RADIOLOGY/PROCEDURES   I have independently interpreted the diagnostic imaging associated with this visit and am waiting the final reading from the radiologist.   My preliminary interpretation is as follows: No fracture or foreign body    Radiologist interpretation:  DX-HAND 3+ LEFT   Final Result         1.  No acute traumatic bony injury.          COURSE & MEDICAL DECISION MAKING    ASSESSMENT, COURSE AND PLAN  Care Narrative: X-ray imaging of the hand demonstrates no fracture or dislocation.  Patient endorses radiating pain up the forearm and numbness to the middle finger concerning for nerve injury.  Patient has been " referred to orthopedic surgery.  Laceration has been irrigated, repaired and patient discharged with doxycycline due to potential infection given exposure to shellfish with a knife.  Patient counseled to return for signs of infection.  Patient counseled to return in 1 week for suture removal.    Repeat physical exam benign.  I doubt any serious emergency process at this time.  Patient and/or family, friends given strict return precautions for worsening symptoms and care instructions. They have demonstrated understanding of discharge instructions through teach back mechanism. Advised PCP follow-up in 1-2 days.  Patient/family/friend expresses understanding and agrees to plan.    This dictation has been created using voice recognition software. I am continuously working with the software to minimize the number of voice recognition errors and I have made every attempt to manually correct the errors within my dictation. However errors  related to this voice recognition software may still exist and should be interpreted within the appropriate context.     Electronically signed by: Karel Person M.D., 5/2/2024 11:05 PM    LACERATION REPAIR PROCEDURE NOTE  The patient's identification was confirmed and consent was obtained.  This procedure was performed by Dr. Person at 11 PM.  Site: Left palm  Sterile procedures observed  Anesthetic used (type and amt): 5 cc 1% lidocaine with epinephrine  Suture type/size: 4-0 Ethilon  Length: 1 cm  # of Sutures: 1  Technique: Simple interrupted  Complexity: Simple  Antibx ointment applied  Tetanus updated  Site anesthetized, irrigated with NS, explored without evidence of foreign body, wound well approximated, site covered with dry, sterile dressing. Patient tolerated procedure well without complications. Instructions for care discussed verbally and patient provided with additional written instructions for homecare and f/u.        DISPOSITION AND DISCUSSIONS      Barriers to care  at this time, including but not limited to: Patient does not have established PCP.     Decision tools and prescription drugs considered including, but not limited to: Pain Medications   over-the-counter pain medications are appropriate, narcotics not indicated at this time  .    FINAL DIAGNOSIS  1. Laceration of left hand without foreign body, initial encounter    2. Finger numbness           Electronically signed by: Karel Person M.D., 5/2/2024 11:02 PM

## 2024-05-03 NOTE — ED TRIAGE NOTES
"Chief Complaint   Patient presents with    Laceration     Pt was cutting oysters, and the knife slipped and cut the palm of the left hand. Movement and circulation intact, however pt reports numbness to middle finger. Wound is bandaged, bleeding has stopped.       Pt is alert and oriented, speaking in full sentences, follows commands and responds appropriately to questions. Resperations are even and unlabored.      Pt placed in lobby. Pt educated on triage process. Pt encouraged to alert staff for any changes.     Patient and staff wearing appropriate PPE.    BP (!) 132/92   Pulse (!) 102   Temp 36.9 °C (98.4 °F) (Temporal)   Resp 16   Ht 1.626 m (5' 4.02\")   Wt 88.4 kg (194 lb 14.2 oz)   SpO2 97%      "

## 2024-05-03 NOTE — ED NOTES
Pt discharged to home. Discharge paperwork provided. Education provided by ERP. Reinforced discharge instructions.  Pt was given follow up instructions and prescriptions.  Pt verbalized understanding of all instructions for discharge.   Patient went out of the ER ambulatory with steady gait., alert and oriented x 4, with all belongings